# Patient Record
Sex: MALE | Race: WHITE | NOT HISPANIC OR LATINO
[De-identification: names, ages, dates, MRNs, and addresses within clinical notes are randomized per-mention and may not be internally consistent; named-entity substitution may affect disease eponyms.]

---
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
## 2025-01-17 ENCOUNTER — OUTPATIENT (OUTPATIENT)
Dept: OUTPATIENT SERVICES | Facility: HOSPITAL | Age: 20
LOS: 1 days | End: 2025-01-17
Payer: MEDICAID

## 2025-01-17 VITALS
HEIGHT: 65 IN | WEIGHT: 93.92 LBS | HEART RATE: 92 BPM | TEMPERATURE: 99 F | SYSTOLIC BLOOD PRESSURE: 123 MMHG | OXYGEN SATURATION: 98 % | RESPIRATION RATE: 18 BRPM | DIASTOLIC BLOOD PRESSURE: 78 MMHG

## 2025-01-17 DIAGNOSIS — Z92.89 PERSONAL HISTORY OF OTHER MEDICAL TREATMENT: Chronic | ICD-10-CM

## 2025-01-17 DIAGNOSIS — R62.50 UNSPECIFIED LACK OF EXPECTED NORMAL PHYSIOLOGICAL DEVELOPMENT IN CHILDHOOD: ICD-10-CM

## 2025-01-17 DIAGNOSIS — G80.9 CEREBRAL PALSY, UNSPECIFIED: ICD-10-CM

## 2025-01-17 DIAGNOSIS — Z01.818 ENCOUNTER FOR OTHER PREPROCEDURAL EXAMINATION: ICD-10-CM

## 2025-01-17 DIAGNOSIS — R56.9 UNSPECIFIED CONVULSIONS: ICD-10-CM

## 2025-01-17 DIAGNOSIS — K05.6 PERIODONTAL DISEASE, UNSPECIFIED: ICD-10-CM

## 2025-01-17 DIAGNOSIS — Z98.89 OTHER SPECIFIED POSTPROCEDURAL STATES: Chronic | ICD-10-CM

## 2025-01-17 DIAGNOSIS — Z98.890 OTHER SPECIFIED POSTPROCEDURAL STATES: Chronic | ICD-10-CM

## 2025-01-17 DIAGNOSIS — K02.9 DENTAL CARIES, UNSPECIFIED: ICD-10-CM

## 2025-01-17 DIAGNOSIS — Z96.89 PRESENCE OF OTHER SPECIFIED FUNCTIONAL IMPLANTS: Chronic | ICD-10-CM

## 2025-01-17 DIAGNOSIS — K02.62 DENTAL CARIES ON SMOOTH SURFACE PENETRATING INTO DENTIN: ICD-10-CM

## 2025-01-17 PROCEDURE — G0463: CPT

## 2025-01-17 RX ORDER — CLOBAZAM 10 MG/1
1 TABLET ORAL
Refills: 0 | DISCHARGE

## 2025-01-17 RX ORDER — ZONISAMIDE 100 MG/1
1 CAPSULE ORAL
Refills: 0 | DISCHARGE

## 2025-01-17 RX ORDER — ZONISAMIDE 100 MG/1
2 CAPSULE ORAL
Refills: 0 | DISCHARGE

## 2025-01-17 RX ORDER — LAMOTRIGINE 100 MG/1
1 TABLET ORAL
Refills: 0 | DISCHARGE

## 2025-01-17 NOTE — H&P PST ADULT - NSICDXPASTSURGICALHX_GEN_ALL_CORE_FT
PAST SURGICAL HISTORY:  History of corpus callostomy corpus callostomy 3/2021    History of dental surgery April 2020    History of MRI MRI brain with sedation on 11/13/20    S/P adenoidectomy 3/31/20 at [place]    S/P endoscopy 2012    Status post VNS (vagus nerve stimulator) placement

## 2025-01-17 NOTE — H&P PST ADULT - PROBLEM SELECTOR PLAN 4
Patient to see neurology prior to procedure for medical examination as patient has frequent seizures, his last one being today (1/17/25).

## 2025-01-17 NOTE — H&P PST ADULT - PROBLEM SELECTOR PLAN 1
Scheduled for comprehensive dental treatment with Dr. Janet Newman on 1/27/25.  Preop instructions provided to mother.  Questions answered.

## 2025-01-17 NOTE — H&P PST ADULT - MUSCULOSKELETAL
decreased muscle tone, generalized/ROM intact/strength 5/5 bilateral upper extremities/strength 5/5 bilateral lower extremities negative

## 2025-01-17 NOTE — H&P PST ADULT - NSICDXPASTMEDICALHX_GEN_ALL_CORE_FT
PAST MEDICAL HISTORY:  Autism spectrum     Chromosomal abnormality     CP (cerebral palsy)     Dental caries on smooth surface penetrating into dentin     Developmental delay     Periodontal disease     Symptomatic generalized epilepsy

## 2025-01-17 NOTE — H&P PST ADULT - ASSESSMENT
DASI score: 4.62  DASI activity: needs assistance with ADL, walking and stairs, no SOB or BURTON observed when performing.   Loose teeth or denture: denies

## 2025-01-17 NOTE — H&P PST ADULT - PROBLEM SELECTOR PLAN 3
Called Pts daughter Jamil Curry and Mukuleda Collar refused HFU with Dr Brenna Guy due to [place] (Dr Yessi Shankar) taking over care.    Started PCP removal for Pt Non-verbal

## 2025-01-17 NOTE — H&P PST ADULT - HISTORY OF PRESENT ILLNESS
19 year old male presents to PST for scheduled comprehensive dental treatment under anesthesia on 1/27/25 with Dr. Janet Newman. PMH seizures (last seizure -1/17/25), intractable lennox-gastaut syndrome, autism, constipation, ZOE, scoliosis, adenoidectomy, neurosurgery, vagal nerve stimulator, dental surgery. Patient is non-verbal, s/p seizures at home today, continuous follow up with neurology, pt on new medication regiment. Mother denies N/V, SOB, BURTON, no signs of chest pain.

## 2025-01-22 ENCOUNTER — APPOINTMENT (OUTPATIENT)
Dept: PEDIATRIC NEUROLOGY | Facility: CLINIC | Age: 20
End: 2025-01-22

## 2025-02-18 ENCOUNTER — NON-APPOINTMENT (OUTPATIENT)
Age: 20
End: 2025-02-18

## 2025-02-27 ENCOUNTER — APPOINTMENT (OUTPATIENT)
Dept: PEDIATRIC NEUROLOGY | Facility: CLINIC | Age: 20
End: 2025-02-27
Payer: MEDICAID

## 2025-02-27 VITALS — HEIGHT: 65 IN | WEIGHT: 98.25 LBS | BODY MASS INDEX: 16.37 KG/M2

## 2025-02-27 DIAGNOSIS — Q99.8 OTHER SPECIFIED CHROMOSOME ABNORMALITIES: ICD-10-CM

## 2025-02-27 DIAGNOSIS — G40.813 LENNOX-GASTAUT SYNDROME, INTRACTABLE, WITH STATUS EPILEPTICUS: ICD-10-CM

## 2025-02-27 DIAGNOSIS — F84.0 AUTISTIC DISORDER: ICD-10-CM

## 2025-02-27 PROCEDURE — 95970 ALYS NPGT W/O PRGRMG: CPT

## 2025-02-27 PROCEDURE — G2211 COMPLEX E/M VISIT ADD ON: CPT | Mod: NC

## 2025-02-27 PROCEDURE — 99215 OFFICE O/P EST HI 40 MIN: CPT

## 2025-03-12 ENCOUNTER — OUTPATIENT (OUTPATIENT)
Dept: OUTPATIENT SERVICES | Age: 20
LOS: 1 days | End: 2025-03-12

## 2025-03-12 VITALS
OXYGEN SATURATION: 100 % | RESPIRATION RATE: 18 BRPM | DIASTOLIC BLOOD PRESSURE: 66 MMHG | HEART RATE: 92 BPM | TEMPERATURE: 98 F | SYSTOLIC BLOOD PRESSURE: 99 MMHG

## 2025-03-12 DIAGNOSIS — G40.813 LENNOX-GASTAUT SYNDROME, INTRACTABLE, WITH STATUS EPILEPTICUS: ICD-10-CM

## 2025-03-12 DIAGNOSIS — Z98.89 OTHER SPECIFIED POSTPROCEDURAL STATES: Chronic | ICD-10-CM

## 2025-03-12 DIAGNOSIS — Z92.89 PERSONAL HISTORY OF OTHER MEDICAL TREATMENT: Chronic | ICD-10-CM

## 2025-03-12 DIAGNOSIS — Z98.890 OTHER SPECIFIED POSTPROCEDURAL STATES: Chronic | ICD-10-CM

## 2025-03-12 DIAGNOSIS — Z96.89 PRESENCE OF OTHER SPECIFIED FUNCTIONAL IMPLANTS: Chronic | ICD-10-CM

## 2025-03-12 LAB
ANION GAP SERPL CALC-SCNC: 13 MMOL/L — SIGNIFICANT CHANGE UP (ref 7–14)
BLD GP AB SCN SERPL QL: NEGATIVE — SIGNIFICANT CHANGE UP
BUN SERPL-MCNC: 14 MG/DL — SIGNIFICANT CHANGE UP (ref 7–23)
CALCIUM SERPL-MCNC: 9.2 MG/DL — SIGNIFICANT CHANGE UP (ref 8.4–10.5)
CHLORIDE SERPL-SCNC: 108 MMOL/L — HIGH (ref 98–107)
CO2 SERPL-SCNC: 22 MMOL/L — SIGNIFICANT CHANGE UP (ref 22–31)
CREAT SERPL-MCNC: 0.84 MG/DL — SIGNIFICANT CHANGE UP (ref 0.5–1.3)
EGFR: 129 ML/MIN/1.73M2 — SIGNIFICANT CHANGE UP
EGFR: 129 ML/MIN/1.73M2 — SIGNIFICANT CHANGE UP
GLUCOSE SERPL-MCNC: 58 MG/DL — LOW (ref 70–99)
HCT VFR BLD CALC: 47.8 % — SIGNIFICANT CHANGE UP (ref 39–50)
HGB BLD-MCNC: 16.6 G/DL — SIGNIFICANT CHANGE UP (ref 13–17)
MCHC RBC-ENTMCNC: 32 PG — SIGNIFICANT CHANGE UP (ref 27–34)
MCHC RBC-ENTMCNC: 34.7 G/DL — SIGNIFICANT CHANGE UP (ref 32–36)
MCV RBC AUTO: 92.1 FL — SIGNIFICANT CHANGE UP (ref 80–100)
NRBC # BLD AUTO: 0 K/UL — SIGNIFICANT CHANGE UP (ref 0–0)
NRBC # FLD: 0 K/UL — SIGNIFICANT CHANGE UP (ref 0–0)
NRBC BLD AUTO-RTO: 0 /100 WBCS — SIGNIFICANT CHANGE UP (ref 0–0)
PLATELET # BLD AUTO: 227 K/UL — SIGNIFICANT CHANGE UP (ref 150–400)
POTASSIUM SERPL-MCNC: 3.9 MMOL/L — SIGNIFICANT CHANGE UP (ref 3.5–5.3)
POTASSIUM SERPL-SCNC: 3.9 MMOL/L — SIGNIFICANT CHANGE UP (ref 3.5–5.3)
RBC # BLD: 5.19 M/UL — SIGNIFICANT CHANGE UP (ref 4.2–5.8)
RBC # FLD: 12.4 % — SIGNIFICANT CHANGE UP (ref 10.3–14.5)
RH IG SCN BLD-IMP: POSITIVE — SIGNIFICANT CHANGE UP
SODIUM SERPL-SCNC: 143 MMOL/L — SIGNIFICANT CHANGE UP (ref 135–145)
WBC # BLD: 6.68 K/UL — SIGNIFICANT CHANGE UP (ref 3.8–10.5)
WBC # FLD AUTO: 6.68 K/UL — SIGNIFICANT CHANGE UP (ref 3.8–10.5)

## 2025-03-12 NOTE — H&P PST ADULT - REASON FOR ADMISSION
The patient is here today for presurgical testing for scheduled removal and reinsertion of vagal nerve stimulator with Dr. Linder on 3/17/25 at [facility] [place] a same day admission.

## 2025-03-12 NOTE — H&P PST ADULT - HISTORY OF PRESENT ILLNESS
19y old male with developmental delays, wheelchair bound and intractable Lennox-Gastaut syndrome with status epilepticus. As per mom, his current vagal nerve stimulation is not working because there is no more battery life. He suffers from seizures daily. He has sialorrhea. ZOE as per mom he never had a sleep study. Mom states he never has a sleep study. He has been sedated in the past for MRI, vagal nerve stimulator implant and dental work. As per mom, he has had no issues with anesthesia. He is scheduled removal and reinsertion of vagal nerve stimulator with Dr. Linder [place]17[place] [facility] [place]. 19y old male with developmental delays, wheelchair bound and intractable Lennox-Gastaut syndrome with status epilepticus. As per mom, his current vagal nerve stimulation is not working because there is no more battery life. He suffers from seizures daily. As per mom she mentioned that when the original vagal nerve stimulator was placed 7 years ago he was only seizure free for one month. As per mom, he has seizures. She states his seizures can look like mild shaking, hanging head down.  He has sialorrhea. There is a diagnoses of ZOE. As per mom, he never had a sleep study. He has been sedated in the past for MRI, vagal nerve stimulator implant and dental work. As per mom, he has had no issues with anesthesia. He is scheduled removal and reinsertion of vagal nerve stimulator with Dr. Linder on 3/17/25 [place] [facility] [place] same day [place]. 19y old male with developmental delays, wheelchair bound and intractable Lennox-Gastaut syndrome with status epilepticus. As per mom, his current vagal nerve stimulation is not working because there is no more battery life. As per mom she mentioned that when the original vagal nerve stimulator was placed 7 years ago he was seizure free for only one month. He suffers from seizures daily. She states his seizures can look like mild shaking, head drops and stretching out legs bilaterally.  He has sialorrhea. There is a diagnoses of ZOE. As per mom, he never had a sleep study. He has been sedated in the past for MRI, vagal nerve stimulator implant and dental work. As per mom, he has had no issues with anesthesia. He is scheduled removal and reinsertion of vagal nerve stimulator with Dr. Linder on 3/17/25 at [facility] [place] as a same day admission.

## 2025-03-12 NOTE — H&P PST ADULT - NSICDXPROCEDURE_GEN_ALL_CORE_FT
PROCEDURES:  Removal of vagal nerve stimulator generator 12-Mar-2025 13:38:16  Citlaly Block  Implantation of vagal nerve stimulator electrode 12-Mar-2025 13:39:52  Citlaly Block

## 2025-03-12 NOTE — H&P PST ADULT - PROBLEM SELECTOR PLAN 1
scheduled removal and reinsertion of vagal nerve stimulator with Dr. Linder on 3/17/25 at [facility] [place].    Reviewed NPO instructions and to call surgeon and PACU if child were to become ill 24 hours prior to scheduled surgery. Mom expressed verbal understanding.    Blood drawn today CBC, T&S , BMP scheduled removal and reinsertion of vagal nerve stimulator with Dr. Linder on 3/17/25 at [facility] [place] as a same day admission.    Reviewed NPO instructions and to call surgeon and PACU if child were to become ill 24 hours prior to scheduled surgery. Mom expressed verbal understanding.    Blood drawn today CBC, T&S , BMP    [place]ee Mind[place] - wheelchair bound, nonverbal, daily seizures seizures can look like mild shaking, head drops and stretching out legs bilaterally.

## 2025-03-12 NOTE — H&P PST ADULT - ATTENDING COMMENTS
explained all the r/b/a of vns generator change. risk include bleeding infection stroke paralysis death.  parents understand and wish to proceed

## 2025-03-12 NOTE — H&P PST ADULT - MUSCULOSKELETAL COMMENTS
wheelchair bound, total care, contracted left arm, hunched back, hold head downward hypotonia, wheelchair bound, contracted upper extremities more so left arm

## 2025-03-12 NOTE — H&P PST ADULT - NSICDXPASTSURGICALHX_GEN_ALL_CORE_FT
PAST SURGICAL HISTORY:  History of corpus callostomy corpus callostomy 3/2021    History of dental surgery April 2020    History of MRI MRI brain with sedation on 11/13/20    S/P adenoidectomy 3/31/20 at [place]    S/P endoscopy 2012    Status post VNS (vagus nerve stimulator) placement

## 2025-03-12 NOTE — H&P PST ADULT - NSICDXPASTMEDICALHX_GEN_ALL_CORE_FT
PAST MEDICAL HISTORY:  Autism spectrum     Chromosomal abnormality     CP (cerebral palsy)     Dental caries on smooth surface penetrating into dentin     Developmental delay     Intractable Lennox-Gastaut syndrome with status epilepticus     Lennox-Gastaut syndrome     Periodontal disease     Symptomatic generalized epilepsy

## 2025-03-12 NOTE — H&P PST ADULT - ASSESSMENT
19y old male with developmental delays, wheelchair bound and intractable Lennox-Gastaut syndrome with status epilepticus. As per mom, his current vagal nerve stimulation is not working because there is no more battery life. He suffers from seizures daily. He has sialorrhea. ZOE as per mom he never had a sleep study. Mom states he never has a sleep study. He has been sedated in the past for MRI, vagal nerve stimulator implant and dental work. As per mom, he has had no issues with anesthesia. He is scheduled removal and reinsertion of vagal nerve stimulator with Dr. Linder on 3/17/25 at [facility] as a same day admission. 19y old male with developmental delays, wheelchair bound and intractable Lennox-Gastaut syndrome with status epilepticus. As per mom, his current vagal nerve stimulation is not working because there is no more battery life. He suffers from seizures daily. As per mom she mentioned that when the original vagal nerve stimulator was placed 7 years ago he was only seizure free for one month. As per mom, he has seizures. She states his seizures can look like mild shaking, hanging head down.  He has sialorrhea. There is a diagnoses of ZOE. As per mom, he never had a sleep study. He has been sedated in the past for MRI, vagal nerve stimulator implant and dental work. As per mom, he has had no issues with anesthesia. He is scheduled removal and reinsertion of vagal nerve stimulator with Dr. Linder on 3/17/25 at [facility] [place] as a same day admission. 19y old male with developmental delays, wheelchair bound and intractable Lennox-Gastaut syndrome with status epilepticus. As per mom, his current vagal nerve stimulation is not working because there is no more battery life. As per mom she mentioned that when the original vagal nerve stimulator was placed 7 years ago he was seizure free for only one month. He suffers from seizures daily. She states his seizures can look like mild shaking, head drops and stretching out legs bilaterally.  He has sialorrhea. There is a diagnoses of ZOE. As per mom, he never had a sleep study. He has been sedated in the past for MRI, vagal nerve stimulator implant and dental work. As per mom, he has had no issues with anesthesia. He is scheduled removal and reinsertion of vagal nerve stimulator with Dr. Linder on 3/17/25 at [facility] [place] as a same day admission.

## 2025-03-17 ENCOUNTER — TRANSCRIPTION ENCOUNTER (OUTPATIENT)
Age: 20
End: 2025-03-17

## 2025-03-17 ENCOUNTER — INPATIENT (INPATIENT)
Age: 20
LOS: 0 days | Discharge: HOME | End: 2025-03-17
Attending: NEUROLOGICAL SURGERY | Admitting: NEUROLOGICAL SURGERY

## 2025-03-17 ENCOUNTER — RX RENEWAL (OUTPATIENT)
Age: 20
End: 2025-03-17

## 2025-03-17 VITALS
TEMPERATURE: 98 F | SYSTOLIC BLOOD PRESSURE: 119 MMHG | HEART RATE: 92 BPM | WEIGHT: 93.92 LBS | RESPIRATION RATE: 18 BRPM | OXYGEN SATURATION: 97 % | DIASTOLIC BLOOD PRESSURE: 90 MMHG

## 2025-03-17 VITALS — SYSTOLIC BLOOD PRESSURE: 94 MMHG | OXYGEN SATURATION: 99 % | HEART RATE: 79 BPM | DIASTOLIC BLOOD PRESSURE: 63 MMHG

## 2025-03-17 DIAGNOSIS — Z98.89 OTHER SPECIFIED POSTPROCEDURAL STATES: Chronic | ICD-10-CM

## 2025-03-17 DIAGNOSIS — Z92.89 PERSONAL HISTORY OF OTHER MEDICAL TREATMENT: Chronic | ICD-10-CM

## 2025-03-17 DIAGNOSIS — Z98.890 OTHER SPECIFIED POSTPROCEDURAL STATES: Chronic | ICD-10-CM

## 2025-03-17 DIAGNOSIS — Z96.89 PRESENCE OF OTHER SPECIFIED FUNCTIONAL IMPLANTS: Chronic | ICD-10-CM

## 2025-03-17 LAB — GLUCOSE BLDC GLUCOMTR-MCNC: 96 MG/DL — SIGNIFICANT CHANGE UP (ref 70–99)

## 2025-03-17 DEVICE — STIM SENTIVA VAGUS NERVE: Type: IMPLANTABLE DEVICE | Status: FUNCTIONAL

## 2025-03-17 RX ORDER — OXYCODONE HYDROCHLORIDE 30 MG/1
5 TABLET ORAL ONCE
Refills: 0 | Status: DISCONTINUED | OUTPATIENT
Start: 2025-03-17 | End: 2025-03-17

## 2025-03-17 RX ORDER — ACETAMINOPHEN 500 MG/5ML
2 LIQUID (ML) ORAL
Qty: 0 | Refills: 0 | DISCHARGE

## 2025-03-17 RX ORDER — IBUPROFEN 200 MG
2 TABLET ORAL
Qty: 0 | Refills: 0 | DISCHARGE

## 2025-03-17 RX ORDER — KETOROLAC TROMETHAMINE 30 MG/ML
20 INJECTION, SOLUTION INTRAMUSCULAR; INTRAVENOUS ONCE
Refills: 0 | Status: DISCONTINUED | OUTPATIENT
Start: 2025-03-17 | End: 2025-03-17

## 2025-03-17 RX ORDER — SODIUM CHLORIDE 9 G/1000ML
500 INJECTION, SOLUTION INTRAVENOUS
Refills: 0 | Status: DISCONTINUED | OUTPATIENT
Start: 2025-03-17 | End: 2025-03-17

## 2025-03-17 NOTE — ASU DISCHARGE PLAN (ADULT/PEDIATRIC) - FINANCIAL ASSISTANCE
[facility] provides services at a reduced cost to those who are determined to be eligible through [facility]’s financial assistance program. Information regarding [facility]’s financial assistance program can be found by going to [email or web address] or by calling [phone].

## 2025-03-17 NOTE — ASU PATIENT PROFILE, PEDIATRIC - NSICDXPASTSURGICALHX_GEN_ALL_CORE_FT
PAST SURGICAL HISTORY:  History of corpus callostomy corpus callostomy 3/2021    History of dental surgery April 2020    History of MRI MRI brain with sedation on 11/13/20    S/P adenoidectomy 3/31/20 at [place]    S/P endoscopy 2012    Status post VNS (vagus nerve stimulator) placement

## 2025-03-17 NOTE — ASU DISCHARGE PLAN (ADULT/PEDIATRIC) - CARE PROVIDER_API CALL
Mat Linder  Neurosurgery  [address] [place] [address] [address]  [place] [address]  Phone: [phone]  Fax: [phone]  Established Patient  Follow Up Time: 2 weeks

## 2025-03-17 NOTE — PACU DISCHARGE NOTE - COMMENTS
patient seen/examined. no apparent anesthesia related complications. meeting discharge criteria. ok to discharge home.
patient seen/examined. no apparent anesthesia related complications. meeting discharge criteria. ok to discharge home.

## 2025-03-20 PROBLEM — G40.813 LENNOX-GASTAUT SYNDROME, INTRACTABLE, WITH STATUS EPILEPTICUS: Chronic | Status: ACTIVE | Noted: 2025-03-12

## 2025-03-20 NOTE — REVIEW OF SYSTEMS
Subjective   History of Present Illness  Patient is 89-year-old female complaining of cough congestion and low-grade fever today.  Family offers no other complaints.      Review of Systems    Past Medical History:   Diagnosis Date    Acute blood loss anemia 02/23/2025    Acute GI bleeding 02/22/2025    Bilateral pseudophakia 04/10/2019    Chronic hypotension 03/10/2025    Collagenous colitis 06/08/2022    Depression 03/28/2024    Epiretinal membrane (ERM) of left eye 07/07/2021    Essential tremor 02/21/2023    Hypertension     Hypothyroidism 06/11/2020    Large hiatal hernia 02/23/2025    MCI (mild cognitive impairment) 11/03/2021    Moderate protein-calorie malnutrition 02/04/2025    Multiple duodenal ulcers 02/23/2025    Osteoporosis 11/10/2016    Other gastritis with bleeding 02/23/2025    Primary open angle glaucoma (POAG) of both eyes 07/07/2021    Rosacea 01/28/2021    Severe pulmonary hypertension 03/10/2025    UTI (urinary tract infection) 01/24/2025    Valvular heart disease 03/10/2025    Weight loss, unintentional 08/28/2023       Allergies   Allergen Reactions    Contrast Dye (Echo Or Unknown Ct/Mr) Unknown - High Severity    Sulfa Antibiotics Unknown - High Severity       Past Surgical History:   Procedure Laterality Date    BACK SURGERY      kyphoplasty    BREAST SURGERY      benign cyst removed    COLONOSCOPY N/A 2/23/2025    Procedure: COLONOSCOPY with POLYPECTOMY;  Surgeon: Kirby Saul MD;  Location: [facility] ENDOSCOPY;  Service: Gastroenterology;  Laterality: N/A;  POST: MULTIPLE COLON POLYPS, EXTENSIVE DIVERTICULOSIS,HEMORRHOIDS    ENDOSCOPY N/A 2/23/2025    Procedure: ESOPHAGOGASTRODUODENOSCOPY with GOLD PROBE & BIOPSY;  Surgeon: Kirby Saul MD;  Location: [facility] ENDOSCOPY;  Service: Gastroenterology;  Laterality: N/A;  POST:HIATAL HERNIA,GASTRITIS    HYSTERECTOMY      LAPAROSCOPIC CHOLECYSTECTOMY         No family history on file.    Social History     Socioeconomic History    Marital  status:    Tobacco Use    Smoking status: Never    Smokeless tobacco: Never   Vaping Use    Vaping status: Never Used   Substance and Sexual Activity    Alcohol use: Never    Drug use: Never    Sexual activity: Never           Objective   Physical Exam  Neck has no adenopathy JVD or bruits.  Lungs are clear.  Heart has regular rate rhythm without murmur.  Chest nontender.  Abdomen soft nontender with normal bowel sounds.  Back has no tenderness.  Procedures           ED Course      Results for orders placed or performed during the hospital encounter of 03/19/25   Respiratory Panel PCR w/COVID-19(SARS-CoV-2) THIERRY/AMITA/CUAUHTEMOC/PAD/COR/PARKER In-House, NP Swab in UTM/VTM, 2 HR TAT - Swab, Nasopharynx    Collection Time: 03/19/25 10:28 PM    Specimen: Nasopharynx; Swab   Result Value Ref Range    ADENOVIRUS, PCR Not Detected Not Detected    Coronavirus 229E Not Detected Not Detected    Coronavirus HKU1 Not Detected Not Detected    Coronavirus NL63 Not Detected Not Detected    Coronavirus OC43 Not Detected Not Detected    COVID19 Not Detected Not Detected - Ref. Range    Human Metapneumovirus Not Detected Not Detected    Human Rhinovirus/Enterovirus Not Detected Not Detected    Influenza A PCR Not Detected Not Detected    Influenza B PCR Not Detected Not Detected    Parainfluenza Virus 1 Not Detected Not Detected    Parainfluenza Virus 2 Not Detected Not Detected    Parainfluenza Virus 3 Not Detected Not Detected    Parainfluenza Virus 4 Not Detected Not Detected    RSV, PCR Detected (A) Not Detected    Bordetella pertussis pcr Not Detected Not Detected    Bordetella parapertussis PCR Not Detected Not Detected    Chlamydophila pneumoniae PCR Not Detected Not Detected    Mycoplasma pneumo by PCR Not Detected Not Detected   Basic Metabolic Panel    Collection Time: 03/19/25 10:28 PM    Specimen: Blood   Result Value Ref Range    Glucose 125 (H) 65 - 99 mg/dL    BUN 19 8 - 23 mg/dL    Creatinine 0.81 0.57 - 1.00 mg/dL     Sodium 142 136 - 145 mmol/L    Potassium 3.2 (L) 3.5 - 5.2 mmol/L    Chloride 99 98 - 107 mmol/L    CO2 30.5 (H) 22.0 - 29.0 mmol/L    Calcium 9.2 8.2 - 9.6 mg/dL    BUN/Creatinine Ratio 23.5 7.0 - 25.0    Anion Gap 12.5 5.0 - 15.0 mmol/L    eGFR 65.7 >60.0 mL/min/1.73   CBC Auto Differential    Collection Time: 03/19/25 10:28 PM    Specimen: Blood   Result Value Ref Range    WBC 10.43 3.40 - 10.80 10*3/mm3    RBC 3.55 (L) 3.77 - 5.28 10*6/mm3    Hemoglobin 9.7 (L) 12.0 - 15.9 g/dL    Hematocrit 33.2 (L) 34.0 - 46.6 %    MCV 93.5 79.0 - 97.0 fL    MCH 27.3 26.6 - 33.0 pg    MCHC 29.2 (L) 31.5 - 35.7 g/dL    RDW 16.2 (H) 12.3 - 15.4 %    RDW-SD 54.7 (H) 37.0 - 54.0 fl    MPV 8.2 6.0 - 12.0 fL    Platelets 490 (H) 140 - 450 10*3/mm3    Neutrophil % 74.9 42.7 - 76.0 %    Lymphocyte % 10.1 (L) 19.6 - 45.3 %    Monocyte % 12.2 (H) 5.0 - 12.0 %    Eosinophil % 1.2 0.3 - 6.2 %    Basophil % 1.1 0.0 - 1.5 %    Immature Grans % 0.5 0.0 - 0.5 %    Neutrophils, Absolute 7.83 (H) 1.70 - 7.00 10*3/mm3    Lymphocytes, Absolute 1.05 0.70 - 3.10 10*3/mm3    Monocytes, Absolute 1.27 (H) 0.10 - 0.90 10*3/mm3    Eosinophils, Absolute 0.12 0.00 - 0.40 10*3/mm3    Basophils, Absolute 0.11 0.00 - 0.20 10*3/mm3    Immature Grans, Absolute 0.05 0.00 - 0.05 10*3/mm3    nRBC 0.0 0.0 - 0.2 /100 WBC   Urinalysis With Microscopic If Indicated (No Culture) - Urine, Catheter    Collection Time: 03/19/25 10:46 PM    Specimen: Urine, Catheter   Result Value Ref Range    Color, UA Yellow Yellow, Straw    Appearance, UA Clear Clear    pH, UA 5.5 5.0 - 8.0    Specific Gravity, UA 1.017 1.005 - 1.030    Glucose, UA Negative Negative    Ketones, UA Negative Negative    Bilirubin, UA Negative Negative    Blood, UA Negative Negative    Protein, UA Negative Negative    Leuk Esterase, UA Moderate (2+) (A) Negative    Nitrite, UA Negative Negative    Urobilinogen, UA 0.2 E.U./dL 0.2 - 1.0 E.U./dL   Urinalysis, Microscopic Only - Urine, Catheter    Collection  Time: 03/19/25 10:46 PM    Specimen: Urine, Catheter   Result Value Ref Range    RBC, UA None Seen None Seen, 0-2 /HPF    WBC, UA 11-20 (A) None Seen, 0-2 /HPF    Bacteria, UA None Seen None Seen /HPF    Squamous Epithelial Cells, UA None Seen None Seen, 0-2 /HPF    Hyaline Casts, UA None Seen None Seen /LPF    Methodology Manual Light Microscopy      No radiology results for the last day                                                     Medical Decision Making  Respiratory panel is positive for RSV.  UA shows no evidence of urinary tract infection.  Patient has no leukocytosis no left shift and anemia at her baseline.  BMP shows no renal insufficiency or electrolyte abnormality.  Patient will be discharged.  She is on ibuprofen for fever control.  She will see MD for recheck as needed.    Amount and/or Complexity of Data Reviewed  Labs: ordered. Decision-making details documented in ED Course.    Risk  Prescription drug management.        Final diagnoses:   RSV bronchitis   Fever, unspecified fever cause       ED Disposition  ED Disposition       ED Disposition   Discharge    Condition   Stable    Comment   --               No follow-up provider specified.       Medication List      No changes were made to your prescriptions during this visit.            Reza Sol MD  03/19/25 5100     [Normal] : Hematologic/Lymphatic [FreeTextEntry8] : see HPI [de-identified] : see HPI

## 2025-03-26 RX ORDER — PERAMPANEL 2 MG/1
2 TABLET ORAL
Qty: 60 | Refills: 0 | Status: ACTIVE | COMMUNITY
Start: 2025-03-26 | End: 1900-01-01

## 2025-04-09 ENCOUNTER — APPOINTMENT (OUTPATIENT)
Dept: PEDIATRIC NEUROLOGY | Facility: CLINIC | Age: 20
End: 2025-04-09
Payer: MEDICAID

## 2025-04-09 VITALS — WEIGHT: 94.69 LBS

## 2025-04-09 DIAGNOSIS — G40.813 LENNOX-GASTAUT SYNDROME, INTRACTABLE, WITH STATUS EPILEPTICUS: ICD-10-CM

## 2025-04-09 DIAGNOSIS — Q99.8 OTHER SPECIFIED CHROMOSOME ABNORMALITIES: ICD-10-CM

## 2025-04-09 DIAGNOSIS — F84.0 AUTISTIC DISORDER: ICD-10-CM

## 2025-04-09 PROCEDURE — G2211 COMPLEX E/M VISIT ADD ON: CPT | Mod: NC

## 2025-04-09 PROCEDURE — 99214 OFFICE O/P EST MOD 30 MIN: CPT

## 2025-04-09 PROCEDURE — 95977 ALYS CPLX CN NPGT PRGRMG: CPT

## 2025-04-23 ENCOUNTER — INPATIENT (INPATIENT)
Age: 20
LOS: 0 days | Discharge: HOME | End: 2025-04-24
Attending: PEDIATRICS | Admitting: PEDIATRICS
Payer: MEDICAID

## 2025-04-23 ENCOUNTER — EMERGENCY (EMERGENCY)
Facility: HOSPITAL | Age: 20
LOS: 1 days | End: 2025-04-23
Attending: EMERGENCY MEDICINE | Admitting: EMERGENCY MEDICINE
Payer: MEDICAID

## 2025-04-23 ENCOUNTER — TRANSCRIPTION ENCOUNTER (OUTPATIENT)
Age: 20
End: 2025-04-23

## 2025-04-23 VITALS
SYSTOLIC BLOOD PRESSURE: 113 MMHG | TEMPERATURE: 98 F | WEIGHT: 93.04 LBS | DIASTOLIC BLOOD PRESSURE: 70 MMHG | RESPIRATION RATE: 26 BRPM | HEART RATE: 90 BPM | OXYGEN SATURATION: 99 % | HEIGHT: 65 IN

## 2025-04-23 VITALS
OXYGEN SATURATION: 99 % | DIASTOLIC BLOOD PRESSURE: 59 MMHG | SYSTOLIC BLOOD PRESSURE: 101 MMHG | HEART RATE: 80 BPM | TEMPERATURE: 98 F | RESPIRATION RATE: 18 BRPM

## 2025-04-23 VITALS
DIASTOLIC BLOOD PRESSURE: 64 MMHG | WEIGHT: 93.04 LBS | HEART RATE: 83 BPM | TEMPERATURE: 98 F | OXYGEN SATURATION: 100 % | SYSTOLIC BLOOD PRESSURE: 97 MMHG | RESPIRATION RATE: 18 BRPM

## 2025-04-23 DIAGNOSIS — Z98.890 OTHER SPECIFIED POSTPROCEDURAL STATES: Chronic | ICD-10-CM

## 2025-04-23 DIAGNOSIS — Z96.89 PRESENCE OF OTHER SPECIFIED FUNCTIONAL IMPLANTS: Chronic | ICD-10-CM

## 2025-04-23 DIAGNOSIS — Z92.89 PERSONAL HISTORY OF OTHER MEDICAL TREATMENT: Chronic | ICD-10-CM

## 2025-04-23 DIAGNOSIS — Z98.89 OTHER SPECIFIED POSTPROCEDURAL STATES: Chronic | ICD-10-CM

## 2025-04-23 DIAGNOSIS — R56.9 UNSPECIFIED CONVULSIONS: ICD-10-CM

## 2025-04-23 PROBLEM — G40.812 LENNOX-GASTAUT SYNDROME, NOT INTRACTABLE, WITHOUT STATUS EPILEPTICUS: Chronic | Status: ACTIVE | Noted: 2025-03-12

## 2025-04-23 LAB
ALBUMIN SERPL ELPH-MCNC: 4 G/DL — SIGNIFICANT CHANGE UP (ref 3.3–5)
ALP SERPL-CCNC: 86 U/L — SIGNIFICANT CHANGE UP (ref 40–120)
ALT FLD-CCNC: 32 U/L — SIGNIFICANT CHANGE UP (ref 12–78)
ANION GAP SERPL CALC-SCNC: 4 MMOL/L — LOW (ref 5–17)
APTT BLD: 36.1 SEC — SIGNIFICANT CHANGE UP (ref 26.1–36.8)
AST SERPL-CCNC: 18 U/L — SIGNIFICANT CHANGE UP (ref 15–37)
B PERT DNA SPEC QL NAA+PROBE: SIGNIFICANT CHANGE UP
B PERT+PARAPERT DNA PNL SPEC NAA+PROBE: SIGNIFICANT CHANGE UP
BASOPHILS # BLD AUTO: 0.03 K/UL — SIGNIFICANT CHANGE UP (ref 0–0.2)
BASOPHILS NFR BLD AUTO: 0.4 % — SIGNIFICANT CHANGE UP (ref 0–2)
BILIRUB SERPL-MCNC: 0.6 MG/DL — SIGNIFICANT CHANGE UP (ref 0.2–1.2)
BUN SERPL-MCNC: 14 MG/DL — SIGNIFICANT CHANGE UP (ref 7–23)
C PNEUM DNA SPEC QL NAA+PROBE: SIGNIFICANT CHANGE UP
CALCIUM SERPL-MCNC: 8.7 MG/DL — SIGNIFICANT CHANGE UP (ref 8.5–10.1)
CHLORIDE SERPL-SCNC: 111 MMOL/L — HIGH (ref 96–108)
CK SERPL-CCNC: 505 U/L — HIGH (ref 26–308)
CO2 SERPL-SCNC: 27 MMOL/L — SIGNIFICANT CHANGE UP (ref 22–31)
CREAT SERPL-MCNC: 0.92 MG/DL — SIGNIFICANT CHANGE UP (ref 0.5–1.3)
EGFR: 123 ML/MIN/1.73M2 — SIGNIFICANT CHANGE UP
EGFR: 123 ML/MIN/1.73M2 — SIGNIFICANT CHANGE UP
EOSINOPHIL # BLD AUTO: 0.13 K/UL — SIGNIFICANT CHANGE UP (ref 0–0.5)
EOSINOPHIL NFR BLD AUTO: 1.7 % — SIGNIFICANT CHANGE UP (ref 0–6)
FLUAV SUBTYP SPEC NAA+PROBE: SIGNIFICANT CHANGE UP
FLUBV RNA SPEC QL NAA+PROBE: SIGNIFICANT CHANGE UP
GLUCOSE SERPL-MCNC: 82 MG/DL — SIGNIFICANT CHANGE UP (ref 70–99)
HADV DNA SPEC QL NAA+PROBE: SIGNIFICANT CHANGE UP
HCOV 229E RNA SPEC QL NAA+PROBE: SIGNIFICANT CHANGE UP
HCOV HKU1 RNA SPEC QL NAA+PROBE: SIGNIFICANT CHANGE UP
HCOV NL63 RNA SPEC QL NAA+PROBE: SIGNIFICANT CHANGE UP
HCOV OC43 RNA SPEC QL NAA+PROBE: SIGNIFICANT CHANGE UP
HCT VFR BLD CALC: 44.9 % — SIGNIFICANT CHANGE UP (ref 39–50)
HGB BLD-MCNC: 16 G/DL — SIGNIFICANT CHANGE UP (ref 13–17)
HMPV RNA SPEC QL NAA+PROBE: SIGNIFICANT CHANGE UP
HPIV1 RNA SPEC QL NAA+PROBE: SIGNIFICANT CHANGE UP
HPIV2 RNA SPEC QL NAA+PROBE: SIGNIFICANT CHANGE UP
HPIV3 RNA SPEC QL NAA+PROBE: SIGNIFICANT CHANGE UP
HPIV4 RNA SPEC QL NAA+PROBE: SIGNIFICANT CHANGE UP
IMM GRANULOCYTES NFR BLD AUTO: 0.5 % — SIGNIFICANT CHANGE UP (ref 0–0.9)
INR BLD: 1.1 RATIO — SIGNIFICANT CHANGE UP (ref 0.85–1.16)
LYMPHOCYTES # BLD AUTO: 1.56 K/UL — SIGNIFICANT CHANGE UP (ref 1–3.3)
LYMPHOCYTES # BLD AUTO: 20.5 % — SIGNIFICANT CHANGE UP (ref 13–44)
M PNEUMO DNA SPEC QL NAA+PROBE: SIGNIFICANT CHANGE UP
MCHC RBC-ENTMCNC: 31.8 PG — SIGNIFICANT CHANGE UP (ref 27–34)
MCHC RBC-ENTMCNC: 35.6 G/DL — SIGNIFICANT CHANGE UP (ref 32–36)
MCV RBC AUTO: 89.3 FL — SIGNIFICANT CHANGE UP (ref 80–100)
MONOCYTES # BLD AUTO: 0.52 K/UL — SIGNIFICANT CHANGE UP (ref 0–0.9)
MONOCYTES NFR BLD AUTO: 6.8 % — SIGNIFICANT CHANGE UP (ref 2–14)
NEUTROPHILS # BLD AUTO: 5.34 K/UL — SIGNIFICANT CHANGE UP (ref 1.8–7.4)
NEUTROPHILS NFR BLD AUTO: 70.1 % — SIGNIFICANT CHANGE UP (ref 43–77)
NRBC BLD AUTO-RTO: 0 /100 WBCS — SIGNIFICANT CHANGE UP (ref 0–0)
PHENYTOIN FREE SERPL-MCNC: 16.5 UG/ML — SIGNIFICANT CHANGE UP (ref 10–20)
PLATELET # BLD AUTO: 210 K/UL — SIGNIFICANT CHANGE UP (ref 150–400)
POTASSIUM SERPL-MCNC: 4 MMOL/L — SIGNIFICANT CHANGE UP (ref 3.5–5.3)
POTASSIUM SERPL-SCNC: 4 MMOL/L — SIGNIFICANT CHANGE UP (ref 3.5–5.3)
PROT SERPL-MCNC: 7 G/DL — SIGNIFICANT CHANGE UP (ref 6–8.3)
PROTHROM AB SERPL-ACNC: 12.8 SEC — SIGNIFICANT CHANGE UP (ref 9.9–13.4)
RAPID RVP RESULT: SIGNIFICANT CHANGE UP
RBC # BLD: 5.03 M/UL — SIGNIFICANT CHANGE UP (ref 4.2–5.8)
RBC # FLD: 12.4 % — SIGNIFICANT CHANGE UP (ref 10.3–14.5)
RSV RNA SPEC QL NAA+PROBE: SIGNIFICANT CHANGE UP
RV+EV RNA SPEC QL NAA+PROBE: SIGNIFICANT CHANGE UP
SARS-COV-2 RNA SPEC QL NAA+PROBE: SIGNIFICANT CHANGE UP
SODIUM SERPL-SCNC: 142 MMOL/L — SIGNIFICANT CHANGE UP (ref 135–145)
TROPONIN I, HIGH SENSITIVITY RESULT: 3.3 NG/L — SIGNIFICANT CHANGE UP
WBC # BLD: 7.62 K/UL — SIGNIFICANT CHANGE UP (ref 3.8–10.5)
WBC # FLD AUTO: 7.62 K/UL — SIGNIFICANT CHANGE UP (ref 3.8–10.5)

## 2025-04-23 PROCEDURE — 36415 COLL VENOUS BLD VENIPUNCTURE: CPT

## 2025-04-23 PROCEDURE — 85730 THROMBOPLASTIN TIME PARTIAL: CPT

## 2025-04-23 PROCEDURE — 84484 ASSAY OF TROPONIN QUANT: CPT

## 2025-04-23 PROCEDURE — 99291 CRITICAL CARE FIRST HOUR: CPT

## 2025-04-23 PROCEDURE — 99285 EMERGENCY DEPT VISIT HI MDM: CPT

## 2025-04-23 PROCEDURE — 71045 X-RAY EXAM CHEST 1 VIEW: CPT | Mod: 26

## 2025-04-23 PROCEDURE — 96374 THER/PROPH/DIAG INJ IV PUSH: CPT

## 2025-04-23 PROCEDURE — 93005 ELECTROCARDIOGRAM TRACING: CPT

## 2025-04-23 PROCEDURE — 71045 X-RAY EXAM CHEST 1 VIEW: CPT

## 2025-04-23 PROCEDURE — 93010 ELECTROCARDIOGRAM REPORT: CPT

## 2025-04-23 PROCEDURE — 82962 GLUCOSE BLOOD TEST: CPT

## 2025-04-23 PROCEDURE — 80053 COMPREHEN METABOLIC PANEL: CPT

## 2025-04-23 PROCEDURE — 85610 PROTHROMBIN TIME: CPT

## 2025-04-23 PROCEDURE — 99291 CRITICAL CARE FIRST HOUR: CPT | Mod: 25

## 2025-04-23 PROCEDURE — 82550 ASSAY OF CK (CPK): CPT

## 2025-04-23 PROCEDURE — 85025 COMPLETE CBC W/AUTO DIFF WBC: CPT

## 2025-04-23 RX ORDER — CLOBAZAM 20 MG/1
20 TABLET ORAL AT BEDTIME
Refills: 0 | Status: DISCONTINUED | OUTPATIENT
Start: 2025-04-23 | End: 2025-04-23

## 2025-04-23 RX ORDER — ZONISAMIDE 25 MG
2 CAPSULE ORAL
Refills: 0 | DISCHARGE

## 2025-04-23 RX ORDER — SODIUM CHLORIDE 9 G/1000ML
1000 INJECTION, SOLUTION INTRAVENOUS
Refills: 0 | Status: DISCONTINUED | OUTPATIENT
Start: 2025-04-23 | End: 2025-04-24

## 2025-04-23 RX ORDER — LAMOTRIGINE 150 MG/1
300 TABLET ORAL
Refills: 0 | Status: DISCONTINUED | OUTPATIENT
Start: 2025-04-23 | End: 2025-04-24

## 2025-04-23 RX ORDER — LAMOTRIGINE 150 MG/1
1 TABLET ORAL
Refills: 0 | DISCHARGE

## 2025-04-23 RX ORDER — LAMOTRIGINE 150 MG/1
300 TABLET ORAL AT BEDTIME
Refills: 0 | Status: DISCONTINUED | OUTPATIENT
Start: 2025-04-23 | End: 2025-04-23

## 2025-04-23 RX ORDER — ZONISAMIDE 25 MG
250 CAPSULE ORAL AT BEDTIME
Refills: 0 | Status: DISCONTINUED | OUTPATIENT
Start: 2025-04-23 | End: 2025-04-24

## 2025-04-23 RX ORDER — LORAZEPAM 4 MG/ML
2 VIAL (ML) INJECTION ONCE
Refills: 0 | Status: DISCONTINUED | OUTPATIENT
Start: 2025-04-23 | End: 2025-04-24

## 2025-04-23 RX ORDER — ZONISAMIDE 25 MG
50 CAPSULE ORAL AT BEDTIME
Refills: 0 | Status: DISCONTINUED | OUTPATIENT
Start: 2025-04-23 | End: 2025-04-23

## 2025-04-23 RX ORDER — FOSPHENYTOIN SODIUM 50 MG/ML
650 INJECTION INTRAMUSCULAR; INTRAVENOUS ONCE
Refills: 0 | Status: COMPLETED | OUTPATIENT
Start: 2025-04-23 | End: 2025-04-23

## 2025-04-23 RX ORDER — CLOBAZAM 20 MG/1
20 TABLET ORAL
Refills: 0 | Status: DISCONTINUED | OUTPATIENT
Start: 2025-04-23 | End: 2025-04-24

## 2025-04-23 RX ORDER — ZONISAMIDE 25 MG
200 CAPSULE ORAL
Refills: 0 | Status: DISCONTINUED | OUTPATIENT
Start: 2025-04-23 | End: 2025-04-24

## 2025-04-23 RX ORDER — DIAZEPAM 2 MG/1
10 TABLET ORAL ONCE
Refills: 0 | Status: DISCONTINUED | OUTPATIENT
Start: 2025-04-23 | End: 2025-04-24

## 2025-04-23 RX ORDER — CLOBAZAM 20 MG/1
15 TABLET ORAL
Refills: 0 | Status: DISCONTINUED | OUTPATIENT
Start: 2025-04-23 | End: 2025-04-24

## 2025-04-23 RX ORDER — LAMOTRIGINE 150 MG/1
250 TABLET ORAL
Refills: 0 | Status: DISCONTINUED | OUTPATIENT
Start: 2025-04-23 | End: 2025-04-24

## 2025-04-23 RX ORDER — LORAZEPAM 4 MG/ML
2 VIAL (ML) INJECTION ONCE
Refills: 0 | Status: DISCONTINUED | OUTPATIENT
Start: 2025-04-23 | End: 2025-04-23

## 2025-04-23 RX ORDER — ZONISAMIDE 25 MG
200 CAPSULE ORAL AT BEDTIME
Refills: 0 | Status: DISCONTINUED | OUTPATIENT
Start: 2025-04-23 | End: 2025-04-23

## 2025-04-23 RX ORDER — FOSPHENYTOIN SODIUM 50 MG/ML
650 INJECTION INTRAMUSCULAR; INTRAVENOUS ONCE
Refills: 0 | Status: DISCONTINUED | OUTPATIENT
Start: 2025-04-23 | End: 2025-04-23

## 2025-04-23 RX ADMIN — Medication 1000 MILLILITER(S): at 12:01

## 2025-04-23 RX ADMIN — Medication 250 MILLIGRAM(S): at 20:47

## 2025-04-23 RX ADMIN — SODIUM CHLORIDE 82 MILLILITER(S): 9 INJECTION, SOLUTION INTRAVENOUS at 21:54

## 2025-04-23 RX ADMIN — SODIUM CHLORIDE 82 MILLILITER(S): 9 INJECTION, SOLUTION INTRAVENOUS at 14:44

## 2025-04-23 RX ADMIN — LAMOTRIGINE 300 MILLIGRAM(S): 150 TABLET ORAL at 20:46

## 2025-04-23 RX ADMIN — FOSPHENYTOIN SODIUM 126 MILLIGRAM(S) PE: 50 INJECTION INTRAMUSCULAR; INTRAVENOUS at 12:35

## 2025-04-23 RX ADMIN — CLOBAZAM 20 MILLIGRAM(S): 20 TABLET ORAL at 19:54

## 2025-04-23 NOTE — PHARMACOTHERAPY INTERVENTION NOTE - INTERVENTION TYPE RECOOMEND
Patient called back to get the results of his Zio Patch. I went over the results with him. Patient said he has a Cardiologist through [facility] so he will follow up with them as scheduled. I told him they should be able to see the Zio Patch results in Epic. He was grateful for the results.    Therapy Recommended - Drug indicated but not ordered

## 2025-04-23 NOTE — PATIENT PROFILE ADULT - FUNCTIONAL SCREEN CURRENT LEVEL: SWALLOWING (IF SCORE 2 OR MORE FOR ANY ITEM, CONSULT REHAB SERVICES), MLM)
Cassi Ponce NP P [facility], S Np Fp Nurse Msg Pool  Can we please order thyroid UTS to evaluate nodule      Incidental finding on CT scan.    0 = swallows foods/liquids without difficulty

## 2025-04-23 NOTE — H&P PEDIATRIC - NSICDXPASTMEDICALHX_GEN_ALL_CORE_FT
PAST MEDICAL HISTORY:  Autism spectrum     Chromosomal abnormality     CP (cerebral palsy)     Developmental delay     Intractable Lennox-Gastaut syndrome with status epilepticus     Lennox-Gastaut syndrome     Symptomatic generalized epilepsy

## 2025-04-23 NOTE — PATIENT PROFILE ADULT - FALL HARM RISK - RISK INTERVENTIONS
Assistance OOB with selected safe patient handling equipment/Assistance with ambulation/Communicate Fall Risk and Risk Factors to all staff, patient, and family/Discuss with provider need for PT consult/Monitor gait and stability/Provide patient with walking aids - walker, cane, crutches/Reinforce activity limits and safety measures with patient and family/Reorient to person, place and time as needed/Review medications for side effects contributing to fall risk/Sit up slowly, dangle for a short time, stand at bedside before walking/Visual Cue: Yellow wristband/Bed in lowest position, wheels locked, appropriate side rails in place/Call bell, personal items and telephone in reach/Instruct patient to call for assistance before getting out of bed or chair/Non-slip footwear when patient is out of bed/[place] to call system/Physically safe environment - no spills, clutter or unnecessary equipment/Purposeful Proactive Rounding/Room/bathroom lighting operational, light cord in reach/Unable to comprehend

## 2025-04-23 NOTE — H&P PEDIATRIC - NSHPLABSRESULTS_GEN_ALL_CORE
16.0   7.62  )-----------( 210      ( 23 Apr 2025 11:52 )             44.9     04-23    142  |  111[H]  |  14  ----------------------------<  82  4.0   |  27  |  0.92    Ca    8.7      23 Apr 2025 12:29    TPro  7.0  /  Alb  4.0  /  TBili  0.6  /  DBili  x   /  AST  18  /  ALT  32  /  AlkPhos  86  04-23      Creatine Kinase: 505 U/L (04.23.25 @ 12:29)     Phenytoin Level, Serum: 16.5 ug/mL (04.23.25 @ 14:55)       Respiratory Viral Panel with COVID-19 by DONY (04.23.25 @ 15:05)   Rapid RVP Result: NotDetec  SARS-CoV-2: NotDetec: This Respiratory Panel uses polymerase chain reaction (PCR) to detect for   adenovirus; coronavirus (HKU1, NL63, 229E, OC43); human metapneumovirus   (hMPV); human enterovirus/rhinovirus (Entero/RV); influenza A; influenza   A/H1; influenza A/H3; influenza A/H1-2009; influenza B; parainfluenza   viruses 1, 2, 3, 4; respiratory syncytial virus; Mycoplasma pneumoniae;   Chlamydophila pneumoniae; and SARS-CoV-2.  Adenovirus (RapRVP): NotDetec  Influenza A (RapRVP): NotDetec  Influenza B (RapRVP): NotDetec  Parainfluenza 1 (RapRVP): NotDetec  Parainfluenza 2 (RapRVP): NotDetec  Parainfluenza 3 (RapRVP): NotDetec  Parainfluenza 4 (RapRVP): NotDetec  Resp Syncytial Virus (RapRVP): NotDetec  Bordetella pertussis (RapRVP): NotDetec  Bordetella parapertussis (RapRVP): NotDetec  Chlamydia pneumoniae (RapRVP): NotDetec  Mycoplasma pneumoniae (RapRVP): NotDetec  Entero/Rhinovirus (RapRVP): NotDetec  HKU1 Coronavirus (RapRVP): NotDetec  NL63 Coronavirus (RapRVP): NotDetec  229E Coronavirus (RapRVP): NotDetec  OC43 Coronavirus (RapRVP): NotDetec  hMPV (RapRVP): NotDetec

## 2025-04-23 NOTE — ED PEDIATRIC TRIAGE NOTE - AVIAN FLU SYMPTOMS
60F PMHx morbid obesity ( BMI 61), HTN, HLD, hx murmur/moderate AS on TTE presenting for hematuria found to have 8cm R kidney mass pending cystoscopy and stent. Med consult for pre-op clearance and co-management    CC: Pt seen w/ Dr. Gao     # Pre-op clearance   METs: <4  RCRI: 0 points class 1 risk of 3.9% for mortality, MI, cardiac arrest in 30 days  WARD: 0.1% risk for mortality, MI, cardiac arrest in 30 days  ECG reviewed: NSR  Echo( outpt) reviewed: Moderate Aortic Stenosis  Patient is low risk for low/moderate risk procedure. No further cardiac work up recommended   Medications to hold perioperatively: Hold HCTZ and Losartan day of procedure, can resume after procedure    # HTN  - cw Carvedilol 6.25mg po q12hr   - hold Losartan 100mg po daily and HCTZ 25mg po daily on the day of procedure ( 4/12) and resume tomorrow   - monitor BP    3 HLD  - c/w Atorvastatin 10mg p qHS     Med consult will follow with you. Thank you. No

## 2025-04-23 NOTE — H&P PEDIATRIC - NSHPPHYSICALEXAM_GEN_ALL_CORE
GENERAL PHYSICAL EXAM  General:        Well nourished, no acute distress  HEENT:         Normocephalic, atraumatic, clear conjunctiva, external ear normal, nasal mucosa normal, oral pharynx clear  Neck:            Supple, full range of motion, no nuchal rigidity  CV:               Regular rate and rhythm, no murmurs. Warm and well perfused.  Respiratory:   Clear to auscultation; Even, nonlabored breathing  Abdominal:    Soft, nontender, nondistended, no masses, no organomegaly  Extremities:    No joint swelling, erythema, tenderness; normal ROM, no contractures  Skin:              No rash, no neurocutaneous stigmata    NEUROLOGIC EXAM  Mental Status:     Oriented to person, place, and date; Good eye contact; follows simple commands  Cranial Nerves:    PERRL, EOMI, no facial asymmetry, V1-V3 intact , symmetric palate, tongue midline.   Eyes:                   Normal: optic discs   Visual Fields:        Full visual field  Motor:                 Full strength 5/5, proximal and distal,  upper and lower extremities, no pronator drift, rapid finger tapping intact, normal tone, no abnormal movements at rest  DTR:                    2+/4 Biceps, Brachioradialis, Triceps Bilateral;  2+/4  Patellar, Ankle bilateral. No clonus.  Babinski:              Plantar reflexes flexion bilaterally  Sensation:            Intact to pain, light touch, temperature and vibration throughout. Negative Romberg  Coordination:       No dysmetria in finger to nose test bilaterally, no ataxia on heel to shin, no dysdiadochokinesia   Gait:                    Normal gait, normal tandem gait, normal toe walking, normal heel walking GENERAL PHYSICAL EXAM  General:        Well nourished, no acute distress  HEENT:         Normocephalic, atraumatic, clear conjunctiva, external ear normal, nasal mucosa normal, oral pharynx clear  Neck:            Supple, full range of motion, no nuchal rigidity  CV:               Regular rate and rhythm, no murmurs. Warm and well perfused.  Respiratory:   Clear to auscultation; Even, nonlabored breathing  Abdominal:    Soft, nontender, nondistended, no masses, no organomegaly  Extremities:    No joint swelling, erythema, tenderness; normal ROM,  Skin:              No rash, no neurocutaneous stigmata    NEUROLOGIC EXAM  Mental Status:     Alert   Cranial Nerves:    PERRL, EOMI, no facial asymmetry, V1-V3 intact , symmetric palate, tongue midline.   Motor:                 Moves all 4 extremities against gravity  DTR:                    2+/4 Biceps  Gait:                    deferred- walks with assistance at baseline

## 2025-04-23 NOTE — PATIENT PROFILE ADULT - HAVE YOU RECENTLY LOST WEIGHT WITHOUT TRYING?
"Time reflects when diagnosis was documented in both MDM as applicable and the Disposition within this note       Time User Action Codes Description Comment    11/19/2024 10:06 AM Brittany Cheema Add [J06.9] Viral URI with cough           ED Disposition       ED Disposition   Discharge    Condition   Good    Date/Time   Tue Nov 19, 2024 10:06 AM    Comment   Arlette Hart discharge to home/self care.                   Assessment & Plan       Medical Decision Making  6-year-old female presenting with her brother who has the same symptoms for evaluation of cough congestion he episodes of vomiting child is very well-appearing on physical exam, pulmonary exam is reassuring, throat exam is reassuring, child has no abdominal pain and is able to tolerate a Pedialyte ice pop in the emergency department, vital signs are stable within normal limits there is no evidence for deep space infection or abnormalities to warrant blood work or imaging at this time.  High suspicion for viral URI, patient's mother educated on symptomatic medications and school note written as requested.    Strict return to ED precautions discussed. Patient and/or family members verbalizes understanding and agrees with plan. Patient is stable for discharge     Portions of the record may have been created with voice recognition software. Occasional wrong word or \"sound a like\" substitutions may have occurred due to the inherent limitations of voice recognition software. Read the chart carefully and recognize, using context, where substitutions have occurred.    Risk  OTC drugs.        ED Course as of 11/19/24 1012   Tue Nov 19, 2024   1008 Patient is tolerating Pedialyte ice pop without difficulty.  Patient was reexamined at this time and was found to be stable for discharge.  Return to emergency department criteria was reviewed with the patient who verbalized understanding and was agreeable to discharge and the treatment plan at this time. " "      Medications - No data to display    ED Risk Strat Scores                                               History of Present Illness       Chief Complaint   Patient presents with    Cough     Cough, \"throwing up phlegm,\" x 2 days.       History reviewed. No pertinent past medical history.   History reviewed. No pertinent surgical history.   History reviewed. No pertinent family history.   Social History     Tobacco Use    Smoking status: Never    Smokeless tobacco: Never      E-Cigarette/Vaping      E-Cigarette/Vaping Substances      I have reviewed and agree with the history as documented.     Patient is a previously healthy 6-year-old female born full-term up-to-date on childhood vaccinations currently in school drinking fluids and urinating as per normal who presents today with her brother who has the same symptoms for evaluation of nasal congestion, mucus production, coughing, scattered episodes of vomiting.  Patient denies any throat pain, ear pain, abdominal pain.  Patient's parents deny any inability to tolerate oral intake.  Patient's report Tylenol has been taking for symptoms however symptoms continue.      Cough  Associated symptoms: rhinorrhea    Associated symptoms: no chest pain, no chills, no ear pain, no fever, no headaches, no rash, no shortness of breath and no sore throat        Review of Systems   Constitutional:  Negative for appetite change, chills and fever.   HENT:  Positive for congestion, postnasal drip and rhinorrhea. Negative for ear pain and sore throat.    Eyes:  Negative for redness.   Respiratory:  Positive for cough. Negative for chest tightness and shortness of breath.    Cardiovascular:  Negative for chest pain.   Gastrointestinal:  Positive for nausea and vomiting. Negative for abdominal pain and diarrhea.   Genitourinary:  Negative for dysuria and hematuria.   Musculoskeletal:  Negative for back pain.   Skin:  Negative for rash.   Neurological:  Negative for dizziness, " syncope, light-headedness and headaches.           Objective       ED Triage Vitals [11/19/24 0929]   Temperature Pulse Blood Pressure Respirations SpO2 Patient Position - Orthostatic VS   98.2 °F (36.8 °C) 85 (!) 98/48 20 98 % Sitting      Temp src Heart Rate Source BP Location FiO2 (%) Pain Score    Oral -- Right arm -- --      Vitals      Date and Time Temp Pulse SpO2 Resp BP Pain Score FACES Pain Rating User   11/19/24 0929 98.2 °F (36.8 °C) 85 98 % 20 98/48 -- -- JN            Physical Exam  Vitals and nursing note reviewed.   Constitutional:       General: She is active.      Appearance: She is well-developed.      Comments: Very well-appearing playful appropriately interactive child   HENT:      Mouth/Throat:      Mouth: Mucous membranes are moist.      Pharynx: Oropharynx is clear.      Comments: Clear oropharynx and posterior oropharynx, no sublingual or submandibular swelling or hypertrophy appreciated.  Patient with a midline uvula.  No tonsillar swelling or exudate appreciated.  Patient is managing oral secretions without difficulty.  No phonation changes.  No submandibular or cervical lymphadenopathy appreciated.  Eyes:      Conjunctiva/sclera: Conjunctivae normal.   Cardiovascular:      Rate and Rhythm: Normal rate and regular rhythm.   Pulmonary:      Effort: Pulmonary effort is normal. No respiratory distress.      Breath sounds: Normal breath sounds.      Comments:  Patient in no respiratory distress, speaking in full sentences, managing oral secretions without difficulty, no accessory muscle use, retractions, or belly breathing noted, no adventitious lung sounds auscultated bilaterally.  Abdominal:      General: There is no distension.      Palpations: Abdomen is soft.      Tenderness: There is no abdominal tenderness.   Skin:     General: Skin is warm and dry.      Capillary Refill: Capillary refill takes less than 2 seconds.      Findings: No rash.   Neurological:      Mental Status: She is  alert.         Results Reviewed       None            No orders to display       Procedures    ED Medication and Procedure Management   Prior to Admission Medications   Prescriptions Last Dose Informant Patient Reported? Taking?   Humidifiers MISC   No No   Sig: Use continuous as needed (cough)   acetaminophen (TYLENOL) 160 mg/5 mL solution   No No   Sig: Take 12.6 mL (403.2 mg total) by mouth every 6 (six) hours as needed for mild pain or moderate pain   clotrimazole (LOTRIMIN) 1 % cream   No No   Sig: Apply topically 2 (two) times a day   ibuprofen (MOTRIN) 100 mg/5 mL suspension   No No   Sig: Take 13.5 mL (270 mg total) by mouth every 6 (six) hours as needed for mild pain or moderate pain   sodium chloride (OCEAN) 0.65 % nasal spray   No No   Si spray into each nostril as needed for congestion (as needed for congestion)      Facility-Administered Medications: None     Patient's Medications   Discharge Prescriptions    ACETAMINOPHEN (TYLENOL) 160 MG/5 ML LIQUID    Take 11.8 mL (377.6 mg total) by mouth every 6 (six) hours as needed for mild pain or fever for up to 7 days       Start Date: 2024End Date: 2024       Order Dose: 377.6 mg       Quantity: 236 mL    Refills: 0    IBUPROFEN (MOTRIN) 100 MG/5 ML SUSPENSION    Take 6.3 mL (126 mg total) by mouth every 6 (six) hours as needed for mild pain       Start Date: 2024End Date: --       Order Dose: 126 mg       Quantity: 237 mL    Refills: 0    ORAL ELECTROLYTES (PEDIALYTE FREEZER POPS) SOLN    Take 1 Units by mouth every 4 (four) hours as needed (hydration)       Start Date: 2024End Date: --       Order Dose: 1 Units       Quantity: 62.5 mL    Refills: 0     No discharge procedures on file.  ED SEPSIS DOCUMENTATION   Time reflects when diagnosis was documented in both MDM as applicable and the Disposition within this note       Time User Action Codes Description Comment    2024 10:06 AM Brittany Cheema Add [J06.9] Viral URI  with cough                  Brittany Cheema PA-C  11/19/24 1018     No (0)

## 2025-04-23 NOTE — H&P PEDIATRIC - HISTORY OF PRESENT ILLNESS
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  His first seizure occurred in August 2015, at age 9. An MRI of the brain at the time was normal and an EEG showed generalized myoclonic electroclinical seizures. In Sept 2016, he developed drop attacks. An EEG at that time showed general onset electroclinical seizures. He has been on several different anticonvulsant medications over the years in an attempt to control his seizures.  He had a VNS placed in Aug 2017 (last changed 3/2025).  Current medications include Onfi 15mg AM/ 20mg PM, Lamotrigine 250mg AM/ 300mg PM as well as Zonisamide 200/250mg.  Onfi dose recommended to increase at last visit 2 weeks ago due to increase seizures but Mother did not increase as he was doing better.  Over the past 24 hours has been having an increase in seizure activity consisting of arm flapping, eyes deviating to the left, and occasional head bobbing".  Family noticed that patient had "greater than 50 episodes of back-to-back seizure episodes" overnight.  Family tried giving intranasal Versed overnight at 3 AM, did not notice any substantial change.  Called EMS who gave intranasal Versed 10 mg at approximately 10:30 AM.  Patient seizures continued, arrived at [facility] where he was loaded with 650 mg of fosphenytoin at approximately 1 PM and transferred [place] [facility] [place].     He has failed several medications for either lack of efficacy or side effects including VPA ( first medicine in 2015), TPM stopped in one month as mother thought it increased seizures, Keppra, then LTG and Onfi added, Tisha's Web CBD oil, ZNS.Clonazepam, Epidiolex, Phenytoin (caused gum swelling). He was on Banzel, felbamate and Fycompa (stopped due to sleepiness as well as increased seizures).    He had VNS in 2017 and his drop seizures stopped for 6 months. 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  His first seizure occurred in August 2015, at age 9. An MRI of the brain at the time was normal and an EEG showed generalized myoclonic electroclinical seizures. In Sept 2016, he developed drop attacks. An EEG at that time showed general onset electroclinical seizures. He has been on several different anticonvulsant medications over the years in an attempt to control his seizures.  He had a VNS placed in Aug 2017 (last changed 3/2025).  Current medications include Onfi 15mg AM/ 20mg PM, Lamotrigine 250mg AM/ 300mg PM as well as Zonisamide 200/250mg.  Onfi dose recommended to increase at last visit 2 weeks ago due to increase seizures but Mother did not increase as he was doing better.  Over the past 24 hours has been having an increase in seizure activity consisting of head drop, head turn to left, staring and stiffening.  Family noticed that patient had "greater than 50 episodes of back-to-back seizure episodes" overnight. Seizures lasted about 1 minute each.   Family tried giving intranasal Versed overnight x 2 at home but did not notice any substantial change.  Called EMS who gave intranasal Versed 10 mg at approximately 10:30 AM.  Patient seizures continued, arrived at [facility] where he was loaded with 650 mg of fosphenytoin at approximately 1 PM and transferred to [facility] for further management.  At baseline, he can have anywhere from 15-20 seizures on a good day but  on a bad day.  "Bad-day's" occur every few weeks sometimes triggered by weather, illness or unknown cause.    He has failed several medications for either lack of efficacy or side effects including VPA ( first medicine in 2015), TPM stopped in one month as mother thought it increased seizures, Keppra, then LTG and Onfi added, Tisha's Web CBD oil, ZNS, .Clonazepam, Epidiolex, Phenytoin (caused gum swelling). He was on Banzel, felbamate, Briviact and Fycompa (stopped due to sleepiness as well as increased seizures).    He had VNS in 2017 and his drop seizures stopped for 6 months. 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

## 2025-04-23 NOTE — ED ADULT NURSE NOTE - NSFALLRISKINTERV_ED_ALL_ED
Assistance OOB with selected safe patient handling equipment if applicable/Assistance with ambulation/Communicate fall risk and risk factors to all staff, patient, and family/Encourage patient to sit up slowly, dangle for a short time, stand at bedside before walking/Monitor gait and stability/Monitor for mental status changes and reorient to person, place, and time, as needed/Provide visual cue: yellow wristband, yellow gown, etc/Reinforce activity limits and safety measures with patient and family/Review medications for side effects contributing to fall risk/Toileting schedule using arm’s reach rule for commode and bathroom/Use of alarms - bed, stretcher, chair and/or video monitoring/Call bell, personal items and telephone in reach/Instruct patient to call for assistance before getting out of bed/chair/stretcher/Non-slip footwear applied when patient is off stretcher/[place] to call system/Physically safe environment - no spills, clutter or unnecessary equipment/Purposeful Proactive Rounding/Room/bathroom lighting operational, light cord in reach

## 2025-04-23 NOTE — PATIENT PROFILE ADULT - FUNCTIONAL ASSESSMENT - BASIC MOBILITY 6.
1-calculated by average/Not able to assess (calculate score using [facility] averaging method)

## 2025-04-23 NOTE — DISCHARGE NOTE PROVIDER - CARE PROVIDER_API CALL
Stephania Segovia  Pediatric Neurology  [address], [address]  [place] [address]  Phone: [phone]  Fax: [phone]  Follow Up Time: 2 weeks

## 2025-04-23 NOTE — ED PROVIDER NOTE - OBJECTIVE STATEMENT
19-year-old male history of autism, epilepsy 19-year-old male history of autism, epilepsy, 19-year-old male history of autism, epilepsy, Lennox Gastaut, chromosome 15 abnormalities, s/p 2nd VNS placement 3/17/25 (1st placed in 2013) presenting for increased seizure frequency. 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 Mother of child noticed that patient was having icnreased seizure frequency more than baseline over past 24 hr.  At baseline, patient has approximately 15-20 episodes of seizures less than 1 minute, often "back-to-back".  Typical seizures involved "arm flapping, eyes deviating to the left, and occasional head bobbing".  Family noticed that patient had "greater than 50 episodes of back-to-back seizure episodes" overnight.  Family tried giving intranasal Versed overnight at 3 AM, did not notice any substantial change.  Called EMS who gave intranasal Versed 10 mg at approximately 10:30 AM.  Patient seizures continued, arrived at [facility] where he was loaded with 650 mg of fosphenytoin at approximately 1 PM.  Started mIVF. Patient had a chest x-ray at [facility] which was clear.  Sent chemistry and CBC that were unremarkable. CK elevated to 505. PT/PTT wnl.   Patient was transferred to [facility] for video EEG and further management with neuro team.  Patient's home medications include lamotrigine 250 mg in the morning and 300 mg before bed, zonisamide 200 mg in the morning and 250 mg before bed, Onfi 15 mg in the morning and 20 mg before bed.  Family states that the seizures have been becoming more frequent "for the past month or so".  Patient's family have concern that he is seizure activity has been "more like grand mal seizures" over the past couple weeks.  Otherwise, patient has been in normal state of health.  No fevers, no nausea vomiting diarrhea, no URI symptoms.  No change in p.o., albeit has not had p.o. today due to postictal state/"drowsiness" after getting fosphenytoin.  At baseline, patient can only ambulate with assistance and is nonverbal. No known sick contacts. No recent travel. NKDA. VUTD. 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 Mother of child noticed that patient was having increased seizure frequency more than baseline over past 24 hr.  At baseline, patient has approximately 15-20 episodes of seizures less than 1 minute, often "back-to-back".  Typical seizures involved "arm flapping, eyes deviating to the left, and occasional head bobbing".  Family noticed that patient had "greater than 50 episodes of back-to-back seizure episodes" overnight.  Family tried giving intranasal Versed overnight at 3 AM, did not notice any substantial change.  Called EMS who gave intranasal Versed 10 mg at approximately 10:30 AM.  Patient seizures continued, arrived at [facility] where he was loaded with 650 mg of fosphenytoin at approximately 1 PM.  Started mIVF. Patient had a chest x-ray at [place] which was clear.  Sent chemistry and CBC that were unremarkable. CK elevated to 505. PT/PTT wnl.   Patient was transferred to [facility] for video EEG and further management with neuro team.  Patient's home medications include lamotrigine 250 mg in the morning and 300 mg before bed, zonisamide 200 mg in the morning and 250 mg before bed, Onfi 15 mg in the morning and 20 mg before bed.  Family states that the seizures have been becoming more frequent "for the past month or so".  Patient's family have concern that he is seizure activity has been "more like grand mal seizures" over the past couple weeks.  Otherwise, patient has been in normal state of health.  No fevers, no nausea vomiting diarrhea, no URI symptoms.  No change in p.o., albeit has not had p.o. today due to postictal state/"drowsiness" after getting fosphenytoin.  At baseline, patient can only ambulate with assistance and is nonverbal. No known sick contacts. No recent travel. NKDA. VUTD.

## 2025-04-23 NOTE — ED ADULT NURSE NOTE - NSICDXPASTSURGICALHX_GEN_ALL_CORE_FT
PAST SURGICAL HISTORY:  History of corpus callostomy corpus callostomy 3/2021    History of dental surgery April 2020    History of MRI MRI brain with sedation on 11/13/20    S/P adenoidectomy 3/31/20 at [place]    S/P endoscopy 2012    Status post VNS (vagus nerve stimulator) placement

## 2025-04-23 NOTE — DISCHARGE NOTE PROVIDER - NSDCMRMEDTOKEN_GEN_ALL_CORE_FT
cloBAZam 10 mg oral tablet: one and a half tab (15 mg) in AM and two tabs (20 mg) in PM  lamoTRIgine 100 mg oral tablet, disintegrating: two and a half tablets (250 mg) in the AM and three tablets (300 mg) in the PM  midazolam 5 mg/inh nasal spray: instill one squirt as directed in one nostril and repeat after 10 minutes for a cluster of seizures MDD:2 squirts  zonisamide 100 mg oral capsule: 2 cap(s) orally 2 times a day  zonisamide 25 mg oral capsule: 2 cap(s) orally once a day (at bedtime) with evening dose for a total of (250 mg)   cloBAZam 20 mg oral tablet: 1 tab(s) orally 2 times a day MDD: 40mg  lamoTRIgine 100 mg oral tablet, disintegrating: orally 2 times a day two and a half tablets (250 mg) in the AM and three tablets (300 mg) in the PM  midazolam 5 mg/inh nasal spray: 1 spray(s) nasal once as needed for seizure cluster Give 1 spray in one nostril for cluster of seizures. Can give 2nd spray (new device) in other nostril if seizure continues 10 minutes after first dose. MDD: 10mg  zonisamide 100 mg oral capsule: 2 cap(s) orally 2 times a day  zonisamide 25 mg oral capsule: 2 cap(s) orally once a day (at bedtime) with evening dose for a total of (250 mg)

## 2025-04-23 NOTE — ED PEDIATRIC TRIAGE NOTE - CHIEF COMPLAINT QUOTE
Transfer from [facility] for 50 seizures overnight as per mother. Patient sleeping, arouses appropriately, easy WOB. 20Gin right forearm and 22G to left AC placed PTA.  PMHx autism, epilepsy, Lennox Gastaut syndrome. SHx VNS placement. NKDA. IUTD.

## 2025-04-23 NOTE — DISCHARGE NOTE PROVIDER - NSDCFUADDINST_GEN_ALL_CORE_FT
Patient may resume all in-home services and outpatient therapies without restrictions.
denies pain/discomfort

## 2025-04-23 NOTE — ED PEDIATRIC NURSE NOTE - CHIEF COMPLAINT QUOTE
Transfer from [facility] for 50 seizures overnight as per mother. Patient sleeping, arouses appropriately, easy WOB. 20Gin right forearm and 22G to left AC placed PTA.  PMHx autism, epilepsy, Lennox Gastaut syndrome. SHx VNS placement. NKDA. IUTD.

## 2025-04-23 NOTE — ED PROVIDER NOTE - PHYSICAL EXAMINATION
General: Tired appearing but opens eyes  HEENT: Airway patent, MMM, EOMI, pupils constricted w/ minimal response to light, eyes clear b/l  CV: Normal S1-S2, no murmurs, rubs or gallops, cap refill <2 sec  Pulm: Clear to auscultation b/l, breath sounds with good aeration bilaterally  Abd: soft, nondistended,  +bs  Neuro: moving all extremities when touched, normal tone  Skin: no cyanosis, no pallor, no rash

## 2025-04-23 NOTE — ED PROVIDER NOTE - CLINICAL SUMMARY MEDICAL DECISION MAKING FREE TEXT BOX
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   Vital signs stable, appears sleepy/postictal.  Will contact neurology team for additional recs.  Will be sending serum levels of home antiepileptics and phenytoin.  Will be put on video EEG, will get RVP. Ordered for PM doses of home AEDs. admitted  under neurology. Parents at bedside and participating in shared decision making. Anthony Martínez MD

## 2025-04-23 NOTE — ED PEDIATRIC NURSE REASSESSMENT NOTE - PAIN RATING/NUMBER SCALE (0-10): ACTIVITY
0 (no pain/absence of nonverbal indicators of pain)
0 (no pain/absence of nonverbal indicators of pain)
seat belt

## 2025-04-23 NOTE — ED ADULT NURSE NOTE - OBJECTIVE STATEMENT
Pt received in T1, parents present at bedside. Pt brought in by EMS from home after having as many as 50 seizures since last night at about 10pm - pt has been taking his medications as appropriate and mother states that she administered his rescue medications without relief of the multiple seizures. EMS witnessed 3 seizures, administered 10 mg Versed intranasally. Pt continuing to have seizures in the ED, not waking up between seizures but mother states that she thinks that is due to the medication EMS gave. Pt followed by Neurology at [facility]. 22G IV in L ac by EMS, labs drawn and sent per orders. IV fluids administered. Awaiting further testing, results, and dispo at this time. Seizure and fall precautions in place.

## 2025-04-23 NOTE — DISCHARGE NOTE PROVIDER - NSDCCPTREATMENT_GEN_ALL_CORE_FT
PRINCIPAL PROCEDURE  Procedure: EEG awake and asleep  Findings and Treatment: multifocal spikes. Two seizures captured.

## 2025-04-23 NOTE — H&P PEDIATRIC - ASSESSMENT
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 He has both medically and surgically refractory epilepsy.  He has failed several medications for either lack of efficacy or side effects including VPA ( first medicine in 2015), TPM, Keppra,  LTG, Onfi, Tisha's Web CBD oil, ZNS. Clonazepam, Epidiolex, Phenytoin, Banzel, felbamate and Fycompa.  He is also s/p VNS in 2017 as well as 2/3 corpus colostomy in 3/2021. Presenting for increased seizures x 24 hours consisting of arm flapping, eyes deviating to the left, and occasional head bobbing.  Loaded with 650 mg of fosphenytoin at approximately 1 PM at [place] [facility] and transferred to [facility] for further management.       Neuro:  - Continue Onfi 15mg AM/ 20mg PM  - Continue Lamotrigine 250mg AM/ 300mg PM   - Continue  Zonisamide 200/250mg  - VEEG monitoring   - Ativan 2mg IV PRN Seizure >5 minutes   - If seizure cluster - Load with Briviact 200mg IV- followed by 100mg BID maintenance dosing     FEN/ GI:  - Reg diet     This patient meets criteria for intractable or poorly controlled epilepsy as he/she/they:   1. Failed to respond to 2 or more appropriately chosen antiseizure medications and are continuing to have seizures despite the parents/caretakers/patient maintaining adherence to the prescribed treatment regimen.   2. Failed to respond to one appropriately chosen medication in the setting of an identified epileptogenic structural lesion of the brain  3. Have experienced poorly controlled seizures in the setting of an underlying static encephalopathy or genetic disorder   4. Have continued to experience seizures on treatment with neurostimulation (VNS) or dietary therapy.      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 He has both medically and surgically refractory epilepsy.  He has failed several medications for either lack of efficacy or side effects including VPA ( first medicine in 2015), TPM, Keppra,  LTG, Onfi, Tisha's Web CBD oil, ZNS. Clonazepam, Epidiolex, Phenytoin, Banzel, felbamate and Fycompa.  He is also s/p VNS in 2017 as well as 2/3 corpus colostomy in 3/2021. Presenting for increased seizures x 24 hours consisting of arm flapping, eyes deviating to the left, and occasional head bobbing.  Loaded with 650 mg of fosphenytoin at approximately 1 PM at [place] [facility] and transferred to [facility] [place].       Neuro:  - Continue Onfi 15mg AM/ 20mg PM  - Continue Lamotrigine 250mg AM/ 300mg PM   - Continue  Zonisamide 200/250mg  - VEEG monitoring   - Ativan 2mg IV PRN Seizure >5 minutes   - If seizure cluster - Load with Briviact 200mg IV- followed by 100mg BID maintenance dosing   - VNS settings checked: Normal:2mA, autostim 2.25mA, Magnet - 2.5 mA.- On time 30 seconds, off time 1.1 minutes.  Duty cycle 35%    FEN/ GI:  - Reg diet     This patient meets criteria for intractable or poorly controlled epilepsy as he/she/they:   1. Failed to respond to 2 or more appropriately chosen antiseizure medications and are continuing to have seizures despite the parents/caretakers/patient maintaining adherence to the prescribed treatment regimen.   2. Failed to respond to one appropriately chosen medication in the setting of an identified epileptogenic structural lesion of the brain  3. Have experienced poorly controlled seizures in the setting of an underlying static encephalopathy or genetic disorder   4. Have continued to experience seizures on treatment with neurostimulation (VNS) or dietary therapy.      Case seen and discussed with Neurology attending,

## 2025-04-23 NOTE — DISCHARGE NOTE PROVIDER - HOSPITAL COURSE
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  His first seizure occurred in August 2015, at age 9. An MRI of the brain at the time was normal and an EEG showed generalized myoclonic electroclinical seizures. In Sept 2016, he developed drop attacks. An EEG at that time showed general onset electroclinical seizures. He has been on several different anticonvulsant medications over the years in an attempt to control his seizures.  He had a VNS placed in Aug 2017 (last changed 3/2025).  Current medications include Onfi 15mg AM/ 20mg PM, Lamotrigine 250mg AM/ 300mg PM as well as Zonisamide 200/250mg.  Onfi dose recommended to increase at last visit 2 weeks ago due to increase seizures but Mother did not increase as he was doing better.  Over the past 24 hours has been having an increase in seizure activity consisting of head drop, head turn to left, staring and stiffening.  Family noticed that patient had "greater than 50 episodes of back-to-back seizure episodes" overnight. Seizures lasted about 1 minute each.   Family tried giving intranasal Versed overnight x 2 at home but did not notice any substantial change.  Called EMS who gave intranasal Versed 10 mg at approximately 10:30 AM.  Patient seizures continued, arrived at [facility] where he was loaded with 650 mg of fosphenytoin at approximately 1 PM and transferred to [facility] [place] for further management.  At baseline, he can have anywhere from 15-20 seizures on a good day but  on a bad day.  "Bad-day's" occur every few weeks sometimes triggered by weather, illness or unknown cause.    He has failed several medications for either lack of efficacy or side effects including VPA ( first medicine in 2015), TPM stopped in one month as mother thought it increased seizures, Keppra, then LTG and Onfi added, Tisha's Web CBD oil, ZNS, .Clonazepam, Epidiolex, Phenytoin (caused gum swelling). He was on Banzel, felbamate, Briviact and Fycompa (stopped due to sleepiness as well as increased seizures).    He had VNS in 2017 and his drop seizures stopped for 6 months. 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 The patient continued to tolerate oral intake with adequate output. The patient remained well-appearing, with no (new) concerning findings noted on physical exam. Care plan, expected course, anticipatory guidance, and strict return precautions discussed in great detail with caregivers, who endorsed understanding. Questions and concerns at the time were addressed. The patient was deemed stable for discharge home with recommended follow-up with their primary care physician in 1-2 days.     <<No medications indicated at time of discharge. 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  His first seizure occurred in August 2015, at age 9. An MRI of the brain at the time was normal and an EEG showed generalized myoclonic electroclinical seizures. In Sept 2016, he developed drop attacks. An EEG at that time showed general onset electroclinical seizures. He has been on several different anticonvulsant medications over the years in an attempt to control his seizures.  He had a VNS placed in Aug 2017 (last changed 3/2025).  Current medications include Onfi 15mg AM/ 20mg PM, Lamotrigine 250mg AM/ 300mg PM as well as Zonisamide 200/250mg.  Onfi dose recommended to increase at last visit 2 weeks ago due to increase seizures but Mother did not increase as he was doing better.  Over the past 24 hours has been having an increase in seizure activity consisting of head drop, head turn to left, staring and stiffening.  Family noticed that patient had "greater than 50 episodes of back-to-back seizure episodes" overnight. Seizures lasted about 1 minute each.   Family tried giving intranasal Versed overnight x 2 at home but did not notice any substantial change.  Called EMS who gave intranasal Versed 10 mg at approximately 10:30 AM.  Patient seizures continued, arrived at [facility] where he was loaded with 650 mg of fosphenytoin at approximately 1 PM and [place] [facility] [place].  At baseline, he can have anywhere from 15-20 seizures on a good day but  on a bad day.  "Bad-day's" occur every few weeks sometimes triggered by weather, illness or unknown cause.    He has failed several medications for either lack of efficacy or side effects including VPA ( first medicine in 2015), TPM stopped in one month as mother thought it increased seizures, Keppra, then LTG and Onfi added, Tisha's Web CBD oil, ZNS, .Clonazepam, Epidiolex, Phenytoin (caused gum swelling). He was on Banzel, felbamate, Briviact and Fycompa (stopped due to sleepiness as well as increased seizures).    He had VNS in 2017 and his drop seizures stopped for 6 months. 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 vEEG (4/23-4/24) with multifocal spikes; 2 seizures captured. Push button events captured. Several without EEG correlate. Patient overall much improved since presentation to ED. Family feels patient has been at his baseline since arriving to neuroscience unit and they are comfortable taking patient home. Will increase home Onfi to 20mg BID (~0.9mg/kg). All other home ASMs to stay the same: Lamotrigine 250mg/300mg and Zonisamide 200mg/250mg. Discharged home to follow up outpt with Dr. Segovia.       On day of discharge, vital signs were reviewed and remained within acceptable range. The patient continued to tolerate oral intake with adequate output. The patient remained well-appearing, with no (new) concerning findings noted on physical exam. Care plan, expected course, anticipatory guidance, and strict return precautions discussed in great detail with caregivers, who endorsed understanding. Questions and concerns at the time were addressed. The patient was deemed stable for discharge home with recommended follow-up with their primary care physician in 1-2 days.     <<Patient may resume all in-home services and outpatient therapies without restrictions.>>    Discharge Vitals:  Vital Signs Last 24 Hrs  T(C): 36.6 (24 Apr 2025 10:17), Max: 36.8 (23 Apr 2025 20:33)  T(F): 97.8 (24 Apr 2025 10:17), Max: 98.2 (23 Apr 2025 20:33)  HR: 90 (24 Apr 2025 10:17) (72 - 98)  BP: 98/59 (24 Apr 2025 10:17) (87/49 - 107/68)  BP(mean): 72 (24 Apr 2025 10:17) (61 - 76)  RR: 18 (24 Apr 2025 10:17) (18 - 21)  SpO2: 100% (24 Apr 2025 10:17) (96% - 100%)    Parameters below as of 24 Apr 2025 06:10  Patient On (Oxygen Delivery Method): room air    Discharge physical:   General:        Thin, Well nourished, no acute distress  HEENT:         Normocephalic, atraumatic  Neck:            Supple, full range of motion  CV:               Warm and well perfused.  Respiratory:   Even, nonlabored breathing on room air  Skin:             warm, dry     NEUROLOGIC EXAM  Mental Status:     Awake, Alert. Tracks ipad.  +stereotypies. Some vocalization appreciated, no words or approximations. (non-verbal at baseline).   Cranial Nerves:    PERRL, EOMI, no facial asymmetry, TUP midline.   Motor:                 Moves all 4 extremities against gravity. Holds ipad with b/l hands.  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  His first seizure occurred in August 2015, at age 9. An MRI of the brain at the time was normal and an EEG showed generalized myoclonic electroclinical seizures. In Sept 2016, he developed drop attacks. An EEG at that time showed general onset electroclinical seizures. He has been on several different anticonvulsant medications over the years in an attempt to control his seizures.  He had a VNS placed in Aug 2017 (last changed 3/2025).  Current medications include Onfi 15mg AM/ 20mg PM, Lamotrigine 250mg AM/ 300mg PM as well as Zonisamide 200/250mg.  Onfi dose recommended to increase at last visit 2 weeks ago due to increase seizures but Mother did not increase as he was doing better.  Over the past 24 hours has been having an increase in seizure activity consisting of head drop, head turn to left, staring and stiffening.  Family noticed that patient had "greater than 50 episodes of back-to-back seizure episodes" overnight. Seizures lasted about 1 minute each.   Family tried giving intranasal Versed overnight x 2 at home but did not notice any substantial change.  Called EMS who gave intranasal Versed 10 mg at approximately 10:30 AM.  Patient seizures continued, arrived at [facility] where he was loaded with 650 mg of fosphenytoin at approximately 1 PM and transferred to [facility] [place] management.  At baseline, he can have anywhere from 15-20 seizures on a good day but  on a bad day.  "Bad-day's" occur every few weeks sometimes triggered by weather, illness or unknown cause.    He has failed several medications for either lack of efficacy or side effects including VPA ( first medicine in 2015), TPM stopped in one month as mother thought it increased seizures, Keppra, then LTG and Onfi added, Tisha's Web CBD oil, ZNS, .Clonazepam, Epidiolex, Phenytoin (caused gum swelling). He was on Banzel, felbamate, Briviact and Fycompa (stopped due to sleepiness as well as increased seizures).    He had VNS in 2017 and his drop seizures stopped for 6 months. 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 vEEG (4/23-4/24) with multifocal spikes; 2 seizures captured. Push button events captured. Several without EEG correlate. Patient overall much improved since presentation to ED. Family feels patient has been at his baseline since arriving to neuroscience unit and they are comfortable taking patient home. Will increase home Onfi to 20mg BID (~0.9mg/kg). All other home ASMs to stay the same: Lamotrigine 250mg/300mg and Zonisamide 200mg/250mg. Discharged home to follow up outpt with Dr. Segovia.       On day of discharge, vital signs were reviewed and remained within acceptable range. The patient continued to tolerate oral intake with adequate output. The patient remained well-appearing, with no (new) concerning findings noted on physical exam. Care plan, expected course, anticipatory guidance, and strict return precautions discussed in great detail with caregivers, who endorsed understanding. Questions and concerns at the time were addressed. The patient was deemed stable for discharge home with recommended follow-up with their primary care physician in 1-2 days.     <<Patient may resume all in-home services and outpatient therapies without restrictions.>>    Discharge Vitals:  Vital Signs Last 24 Hrs  T(C): 36.6 (24 Apr 2025 10:17), Max: 36.8 (23 Apr 2025 20:33)  T(F): 97.8 (24 Apr 2025 10:17), Max: 98.2 (23 Apr 2025 20:33)  HR: 90 (24 Apr 2025 10:17) (72 - 98)  BP: 98/59 (24 Apr 2025 10:17) (87/49 - 107/68)  BP(mean): 72 (24 Apr 2025 10:17) (61 - 76)  RR: 18 (24 Apr 2025 10:17) (18 - 21)  SpO2: 100% (24 Apr 2025 10:17) (96% - 100%)    Parameters below as of 24 Apr 2025 06:10  Patient On (Oxygen Delivery Method): room air    Discharge physical:   General:        Thin, Well nourished, no acute distress  HEENT:         Normocephalic, atraumatic  Neck:            Supple, full range of motion  CV:               Warm and well perfused.  Respiratory:   Even, nonlabored breathing on room air  Skin:             warm, dry     NEUROLOGIC EXAM  Mental Status:     Awake, Alert. Tracks ipad.  +stereotypies. Some vocalization appreciated, no words or approximations. (non-verbal at baseline).   Cranial Nerves:    PERRL, EOMI, no facial asymmetry, TUP midline.   Motor:                 Moves all 4 extremities against gravity.   Coordination:       reaches for/Holds ipad with b/l hands.  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  His first seizure occurred in August 2015, at age 9. An MRI of the brain at the time was normal and an EEG showed generalized myoclonic electroclinical seizures. In Sept 2016, he developed drop attacks. An EEG at that time showed general onset electroclinical seizures. He has been on several different anticonvulsant medications over the years in an attempt to control his seizures.  He had a VNS placed in Aug 2017 (last changed 3/2025).  Current medications include Onfi 15mg AM/ 20mg PM, Lamotrigine 250mg AM/ 300mg PM as well as Zonisamide 200/250mg.  Onfi dose recommended to increase at last visit 2 weeks ago due to increase seizures but Mother did not increase as he was doing better.  Over the past 24 hours has been having an increase in seizure activity consisting of head drop, head turn to left, staring and stiffening.  Family noticed that patient had "greater than 50 episodes of back-to-back seizure episodes" overnight. Seizures lasted about 1 minute each.   Family tried giving intranasal Versed overnight x 2 at home but did not notice any substantial change.  Called EMS who gave intranasal Versed 10 mg at approximately 10:30 AM.  Patient seizures continued, arrived at [facility] where he was loaded with 650 mg of fosphenytoin at approximately 1 PM and transferred to [facility] [place] for further management.  At baseline, he can have anywhere from 15-20 seizures on a good day but  on a bad day.  "Bad-day's" occur every few weeks sometimes triggered by weather, illness or unknown cause.    He has failed several medications for either lack of efficacy or side effects including VPA ( first medicine in 2015), TPM stopped in one month as mother thought it increased seizures, Keppra, then LTG and Onfi added, Tisha's Web CBD oil, ZNS, .Clonazepam, Epidiolex, Phenytoin (caused gum swelling). He was on Banzel, felbamate, Briviact and Fycompa (stopped due to sleepiness as well as increased seizures).    He had VNS in 2017 and his drop seizures stopped for 6 months. 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 vEEG (4/23-4/24) with multifocal spikes; 2 seizures captured. Push button events captured. Several without EEG correlate. Patient overall much improved since presentation to ED. Family feels patient has been at his baseline since arriving to neuroscience unit and they are comfortable taking patient home. Will increase home Onfi to 20mg BID (~0.9mg/kg). All other home ASMs to stay the same: Lamotrigine 250mg/300mg and Zonisamide 200mg/250mg. Discharged home to follow up outpt with Dr. Segovia.       On day of discharge, vital signs were reviewed and remained within acceptable range. The patient continued to tolerate oral intake with adequate output. The patient remained well-appearing, with no (new) concerning findings noted on physical exam. Care plan, expected course, anticipatory guidance, and strict return precautions discussed in great detail with caregivers, who endorsed understanding. Questions and concerns at the time were addressed. The patient was deemed stable for discharge home with recommended follow-up with their primary care physician in 1-2 days.     <<Patient may resume all in-home services and outpatient therapies without restrictions.>>    Discharge Vitals:  Vital Signs Last 24 Hrs  T(C): 36.6 (24 Apr 2025 10:17), Max: 36.8 (23 Apr 2025 20:33)  T(F): 97.8 (24 Apr 2025 10:17), Max: 98.2 (23 Apr 2025 20:33)  HR: 90 (24 Apr 2025 10:17) (72 - 98)  BP: 98/59 (24 Apr 2025 10:17) (87/49 - 107/68)  BP(mean): 72 (24 Apr 2025 10:17) (61 - 76)  RR: 18 (24 Apr 2025 10:17) (18 - 21)  SpO2: 100% (24 Apr 2025 10:17) (96% - 100%)    Parameters below as of 24 Apr 2025 06:10  Patient On (Oxygen Delivery Method): room air    Discharge physical:   General:        Thin, Well nourished, no acute distress  HEENT:         Normocephalic, atraumatic  Neck:            Supple, full range of motion  CV:               Warm and well perfused.  Respiratory:   Even, nonlabored breathing on room air  Skin:             warm, dry     NEUROLOGIC EXAM  Mental Status:     Awake, Alert. Tracks ipad.  +stereotypies. Some vocalization appreciated, no words or approximations. (non-verbal at baseline).   Cranial Nerves:    PERRL, EOMI, no facial asymmetry, TUP midline.   Motor:                 Moves all 4 extremities against gravity.   Coordination:       reaches for/Holds ipad with b/l hands.  Gait:                    deferred- walks with assistance at baseline    I was physically present for key portions of the evaluation and management (E/M) service provided. I agree with the history, physical examination, assessment and plan as written. All edits/revisions/additions were made to the document.    Luciano Courtney MD  Attending Physician  Pediatric Neurology/Epilepsy

## 2025-04-23 NOTE — ED PROVIDER NOTE - DIFFERENTIAL DIAGNOSIS
Differential Diagnosis Rule out acute status epilepticus, electrolyte abnormality, leukocytosis, other acute pathology

## 2025-04-23 NOTE — H&P PEDIATRIC - NSICDXPASTSURGICALHX_GEN_ALL_CORE_FT
PAST SURGICAL HISTORY:  History of corpus callostomy corpus callostomy 3/2021    History of dental surgery April 2020    History of MRI MRI brain with sedation on 11/13/20    S/P adenoidectomy 3/31/20 at [place]    S/P endoscopy 2012    Status post VNS (vagus nerve stimulator) placement

## 2025-04-23 NOTE — ED ADULT TRIAGE NOTE - CHIEF COMPLAINT QUOTE
patient brought in by ems- for multiple seizures, which started at 10pm , generalised tonic clonic seizures- one every 15minutes- as stated by ems- -patient was given inj versed 10mg intranasaly by ems- last dose of versed given by mother was at 330am- BGL- 103. patient has an IV #22 on the left AC inserted by ems- patient on lamictal, onfi and zonisamide

## 2025-04-23 NOTE — ED PROVIDER NOTE - CLINICAL SUMMARY MEDICAL DECISION MAKING FREE TEXT BOX
Acute seizures/status epilepticus today.  Currently not seizing.  Will check labs, x-ray, IV, possible meds, admission/transfer

## 2025-04-23 NOTE — ED PROVIDER NOTE - NSICDXPASTSURGICALHX_GEN_ALL_CORE_FT
PAST SURGICAL HISTORY:  History of corpus callostomy corpus callostomy 3/2021    History of dental surgery April 2020    History of MRI MRI brain with sedation on 11/13/20    S/P adenoidectomy 3/31/20 at [place]    S/P endoscopy 2012    Status post VNS (vagus nerve stimulator) placement

## 2025-04-23 NOTE — ED PROVIDER NOTE - OBJECTIVE STATEMENT
19-year-old male with a history of autism, nonverbal, seizure disorder with usual symptoms of multiple/many seizures per day.  However since last night the patient is had on and off constant seizures, with a total of close to 50 seizures since last night.  EMS states that they witnessed 3.  The patient was given some nasal Versed by mom around 3:30 AM.  EMS gave 10 mg of nasal Versed as well enroute.  The patient did not have any recent fall or illness.  No cough/URI.  No recent trauma.  No aggravating or alleviating factors otherwise noted.  No other history available.

## 2025-04-23 NOTE — ED PROVIDER NOTE - ATTENDING CONTRIBUTION TO CARE
18y/o with intractable epilepsy, autism, transferred from OSH for further management of seizure, s/p versed x2, s/p fospheyntoin load at other hospital. Arrives post ictal and tired s/p multiple AEDs. Hemodynamically stable, protecting airway, gag reflex intact, responding to painful stimuli, no increased tone. No signs/features of status epilepticus, neck supple, no meningismus. Admission pending, will discuss with neuro additional AED and EEG monitoring.    Additional medical decision making as documented by myself and/or PA/NP/resident/fellow in patient's chart. - Wanda Villanueva MD

## 2025-04-23 NOTE — H&P PEDIATRIC - NS ATTEND AMEND GEN_ALL_CORE FT
From: Kasie Tolentino  To: Louisa Padilla  Sent: 10/11/2021 1:51 PM CDT  Subject: Refill    Good afternoon Dr. Padilla and staff,    I will need a new prescription for the Januvia since the dose was increased to 100mg daily. I have enough till Thursday as I only have one pill left for Thursday morning of the 50mg tablets.    My pharmacy is [facility] in [facility] [place] on [facility].    Thank you and have a great day, Kasie  
A complete review of available medical records and pertinent medical literature, elicitation of history, neurological examination, review of any paraclinical studies including laboratory studies, neuroimaging and electroencephalographic recordings if performed, discussion of diagnostic evaluation and treatment plan with parent(s), and/or care provider(s) and/or house staff was conducted as appropriate.

## 2025-04-23 NOTE — ED PEDIATRIC NURSE REASSESSMENT NOTE - NS ED NURSE REASSESS COMMENT FT2
Pt resting comfortably in bed with no apparent signs of distress and parent at bedside, age appropriate behavior noted. VS as per flowsheet, pt remains on cardiac monitor and cont pulse ox and veeg. Pain reassessed. Family/pt updated on POC. Assessment ongoing.
pt post ictal but arousable. VS as per flowsheet, pt remains on cardiac monitor and cont pulse ox and VEEG. Pain reassessed. Family/pt updated on POC. Assessment ongoing.

## 2025-04-23 NOTE — ED PROVIDER NOTE - PROGRESS NOTE DETAILS
spoke w/ neurology; plan for ativan 2mg IV push if sz lasting >3min. can try ativan x2, then depakote and call neurology. ordered PM medications Anthony Martínez MD pt's mental status improving (now looks around room, opens eyes on own, moving hands, making noise, watching TV). pt will go to neurosci floor on veeg. Anthony Martínez MD

## 2025-04-23 NOTE — ED PROVIDER NOTE - NSICDXPASTSURGICALHX_GEN_ALL_CORE_FT
PAST SURGICAL HISTORY:  History of corpus callostomy corpus callostomy 3/2021    History of dental surgery April 2020    History of MRI MRI brain with sedation on 11/13/20    S/P adenoidectomy 3/31/20 at [place]    S/P endoscopy 2012    Status post VNS (vagus nerve stimulator) placement

## 2025-04-23 NOTE — ED PROVIDER NOTE - PROGRESS NOTE DETAILS
Discussed with the patient's neurologist, Dr. Segovia.  She states that we should give a loading dose of fosphenytoin, should transfer to [facility] for continuous EEG monitoring. Discussed with patient's parents, the patient reportedly had up to 5 short less than 1 minute seizures while in the ED.  Awaiting fosphenytoin from the pharmacy.  Discussed with the patient's parents regarding the nature of his symptoms, and need for transfer.  They agree with the plan. Discussed with [facility]'s transfer center, they have accepted the patient to [facility]'s ED, Dr. Wanda Dexter.  Tier 1. Patient doing well, the parents state that he is occasionally having his very short episodes of slight shaking, but not having full tonic-clonic seizures anymore.  We will continue to monitor.  Awaiting EMS EMS at the bedside, awaiting transport

## 2025-04-23 NOTE — ED PROVIDER NOTE - MDM ORDERS SUBMITTED SELECTION
[Vasectomy (partner)] : has a partner with a vasectomy [Y] : Patient is sexually active [Normal Amount/Duration] :  normal amount and duration [Regular Cycle Intervals] : periods have been regular [No] : Patient does not have concerns regarding sex [Currently Active] : currently active [FreeTextEntry1] : 46 yo  presents annual exam w/o complaints. Patient states her menstrual cycles are shortnening in interval with normal flow. [Mammogramdate] : 2 [PapSmeardate] : 2 [PGHxTotal] : 5 [[facility]] : 5 [[facility]] : 4 Imaging Studies/Medications

## 2025-04-23 NOTE — DISCHARGE NOTE PROVIDER - NSDCCPCAREPLAN_GEN_ALL_CORE_FT
PRINCIPAL DISCHARGE DIAGNOSIS  Diagnosis: Seizures  Assessment and Plan of Treatment:      PRINCIPAL DISCHARGE DIAGNOSIS  Diagnosis: Seizures  Assessment and Plan of Treatment: CARE DURING SEIZURES — If you witness your child's seizure, it is important to prevent the child from harming him or herself.  - Place the child on their side to keep the throat clear and allow secretions (saliva or vomit) to drain. Do not try to stop the child's movements or convulsions. Do not put anything in the child's mouth, and do not try to hold the tongue. It is not possible to swallow the tongue, although some children may bite their tongue during a seizure, which can cause bleeding. If this happens, it usually does not cause serious harm.  - Keep an eye on a clock or watch.  - Move the child away from potential hazards, such as a stove, furniture, stairs, or traffic.  - Stay with the child until the seizure ends. Allow the child to sleep after the seizure if he/she is tired. Explain what happened and reassure the child that they are safe when they awaken.  SEIZURE PRECAUTIONS  - Avoid any activity that can result in a fall if the child has a seizure during the activity  - Avoid heights above 3 feet  - If the child is around water, in a tub, or swimming, make sure there is one person responsible for watching the child. If they have a seizure while swimming, they are at risk for drowning

## 2025-04-24 ENCOUNTER — TRANSCRIPTION ENCOUNTER (OUTPATIENT)
Age: 20
End: 2025-04-24

## 2025-04-24 VITALS
SYSTOLIC BLOOD PRESSURE: 98 MMHG | HEART RATE: 90 BPM | RESPIRATION RATE: 18 BRPM | OXYGEN SATURATION: 100 % | DIASTOLIC BLOOD PRESSURE: 59 MMHG | TEMPERATURE: 98 F

## 2025-04-24 PROCEDURE — 95720 EEG PHY/QHP EA INCR W/VEEG: CPT

## 2025-04-24 PROCEDURE — 99236 HOSP IP/OBS SAME DATE HI 85: CPT

## 2025-04-24 RX ORDER — CLOBAZAM 20 MG/1
1 TABLET ORAL
Qty: 60 | Refills: 0
Start: 2025-04-24 | End: 2025-05-23

## 2025-04-24 RX ORDER — CLOBAZAM 20 MG/1
0 TABLET ORAL
Refills: 0 | DISCHARGE

## 2025-04-24 RX ORDER — LAMOTRIGINE 150 MG/1
300 TABLET ORAL AT BEDTIME
Refills: 0 | Status: DISCONTINUED | OUTPATIENT
Start: 2025-04-24 | End: 2025-04-24

## 2025-04-24 RX ORDER — LAMOTRIGINE 150 MG/1
250 TABLET ORAL DAILY
Refills: 0 | Status: DISCONTINUED | OUTPATIENT
Start: 2025-04-24 | End: 2025-04-24

## 2025-04-24 RX ORDER — LAMOTRIGINE 150 MG/1
0 TABLET ORAL
Qty: 0 | Refills: 0 | DISCHARGE

## 2025-04-24 RX ORDER — MIDAZOLAM IN 0.9 % SOD.CHLORID 1 MG/ML
0 PLASTIC BAG, INJECTION (ML) INTRAVENOUS
Refills: 0 | DISCHARGE

## 2025-04-24 RX ORDER — MIDAZOLAM IN 0.9 % SOD.CHLORID 1 MG/ML
1 PLASTIC BAG, INJECTION (ML) INTRAVENOUS
Qty: 1 | Refills: 0
Start: 2025-04-24 | End: 2025-05-03

## 2025-04-24 RX ADMIN — SODIUM CHLORIDE 82 MILLILITER(S): 9 INJECTION, SOLUTION INTRAVENOUS at 09:35

## 2025-04-24 RX ADMIN — CLOBAZAM 15 MILLIGRAM(S): 20 TABLET ORAL at 07:49

## 2025-04-24 RX ADMIN — SODIUM CHLORIDE 82 MILLILITER(S): 9 INJECTION, SOLUTION INTRAVENOUS at 07:08

## 2025-04-24 RX ADMIN — LAMOTRIGINE 250 MILLIGRAM(S): 150 TABLET ORAL at 08:49

## 2025-04-24 RX ADMIN — Medication 200 MILLIGRAM(S): at 07:50

## 2025-04-24 NOTE — EEG REPORT - NS EEG TEXT BOX
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     Medications:   cloBAZam Oral Tab/Cap - Peds 15 milliGRAM(s) Oral <User Schedule>  cloBAZam Oral Tab/Cap - Peds 20 milliGRAM(s) Oral <User Schedule>  diazepam Rectal Gel - Peds 10 milliGRAM(s) Rectal once PRN  lamoTRIgine  Oral Tab/Cap - Peds 250 milliGRAM(s) Oral daily  lamoTRIgine  Oral Tab/Cap - Peds 300 milliGRAM(s) Oral at bedtime  LORazepam IV Push - Peds 2 milliGRAM(s) IV Push once PRN  zonisamide Oral Tab/Cap - Peds 250 milliGRAM(s) Oral at bedtime  zonisamide Oral Tab/Cap - Peds 200 milliGRAM(s) Oral <User Schedule>  ___________________________________________________________________________  Recording Technique:     The patient underwent continuous Video/EEG monitoring using a cable telemetry system [employer].  The EEG was recorded from 21 electrodes using the standard 10/20 placement, with EKG.  Time synchronized digital video recording was done simultaneously with EEG recording.    The EEG was continuously sampled on disk, and spike detection and seizure detection algorithms marked portions of the EEG for further analysis offline.  Video data was stored on disk for important clinical events (indicated by manual pushbutton) and for periods identified by the seizure detection algorithm, and analyzed offline.      Video and EEG data were reviewed by the electroencephalographer on a daily basis, and selected segments were archived on compact disc.      The patient was attended by an EEG technician for eight to ten hours per day.  Patients were observed by the epilepsy nursing staff 24 hours per day.  The epilepsy center neurologist was available in person or on call 24 hours per day during the period of monitoring.    ___________________________________________________________________________    Background:  Predominant background c/o diffuse delta and theta slowing, at times with overriding beta activity. There was no clearly defined PDR or AP gradient present. Paucity of normal sleep architecture.     Slowing:  Generalized slowing as noted above.     Attenuation and Asymmetry: None.    Interictal Activity:    Abundant multifocal spike and polyspike wave complexes, that at times appeared most prevalent over the bifrontal regions, occurring in brief runs.      Patient Events/ Ictal Activity: Two electroclinical seizures present, with initial seizure characterized at onset by sentinal spike f/b diffuse attenuation, evolving to rhythmic fast activity diffusely (higher amplitude over the left hemisphere) and then polyspike wave activity that persists over the right frontal region,  offset unclear. Clinically, patient is noted to stiffen with head version to the left. The second seizure has largely the same features, with exception of a clear electrographic offset, with diffuse attenuation. See timings below:     d1 18:02:41		onset sz 1  d1 18:02:41		sent spike  d1 18:02:41		diff atten  d1 18:02:43		rhy fast  d1 18:02:43		higher amp spikes left  d1 18:02:45		CLINICAL head dev to left  d1 18:02:49		CLINICAL off  d1 18:02:53		spike wave right continues  d1 18:15:24		alert and interactive    d1 20:27:49		onset  d1 20:27:49		sent spike  d1 20:27:50		CLINICAL tonic movements  d1 20:27:51		rythmic fast  d1 20:27:54		diffuse spike wave activity  d1 20:27:55		CLINICAL subtle clonic movements head, UE  d1 20:28:07		diffuse attenuation    Activation Procedures:  Not performed.     EKG:  No clear abnormalities were noted.     Impression:  This is an abnormal video EEG study:   - Two electroclinical tonic seizures captured, with generalized onset as described above.   - Abundant multifocal spike and polyspike wave complexes, that at times appeared most prevalent over the bifrontal regions, occurring in brief runs.   - Background disorganization.     Clinical Correlation:  The EEG findings are diagnostic of tonic seizures, with onset generalized for two captured episodes, but interictal findings indicative of a tendency for seizures to originate from multiple foci. Background findings and disorganization are consistent with an epileptic encephalopathy.     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     Medications:   cloBAZam Oral Tab/Cap - Peds 15 milliGRAM(s) Oral <User Schedule>  cloBAZam Oral Tab/Cap - Peds 20 milliGRAM(s) Oral <User Schedule>  diazepam Rectal Gel - Peds 10 milliGRAM(s) Rectal once PRN  lamoTRIgine  Oral Tab/Cap - Peds 250 milliGRAM(s) Oral daily  lamoTRIgine  Oral Tab/Cap - Peds 300 milliGRAM(s) Oral at bedtime  LORazepam IV Push - Peds 2 milliGRAM(s) IV Push once PRN  zonisamide Oral Tab/Cap - Peds 250 milliGRAM(s) Oral at bedtime  zonisamide Oral Tab/Cap - Peds 200 milliGRAM(s) Oral <User Schedule>  ___________________________________________________________________________  Recording Technique:     The patient underwent continuous Video/EEG monitoring using a cable telemetry system [employer].  The EEG was recorded from 21 electrodes using the standard 10/20 placement, with EKG.  Time synchronized digital video recording was done simultaneously with EEG recording.    The EEG was continuously sampled on disk, and spike detection and seizure detection algorithms marked portions of the EEG for further analysis offline.  Video data was stored on disk for important clinical events (indicated by manual pushbutton) and for periods identified by the seizure detection algorithm, and analyzed offline.      Video and EEG data were reviewed by the electroencephalographer on a daily basis, and selected segments were archived on compact disc.      The patient was attended by an EEG technician for eight to ten hours per day.  Patients were observed by the epilepsy nursing staff 24 hours per day.  The epilepsy center neurologist was available in person or on call 24 hours per day during the period of monitoring.    ___________________________________________________________________________    Background:  Predominant background c/o diffuse delta and theta slowing, at times with overriding beta activity. There was no clearly defined PDR or AP gradient present. Paucity of normal sleep architecture.     Slowing:  Generalized slowing as noted above.     Attenuation and Asymmetry: None.    Interictal Activity:    Abundant multifocal spike and polyspike wave complexes, that at times appeared most prevalent over the bifrontal regions, occurring in brief runs.      Patient Events/ Ictal Activity: Two electroclinical seizures present, with initial seizure characterized at onset by sentinal spike f/b diffuse attenuation, evolving to rhythmic fast activity diffusely (higher amplitude over the left hemisphere) and then polyspike wave activity that persists over the right frontal region,  offset unclear. Clinically, patient is noted to stiffen with head version to the left. The second seizure has largely the same features, with exception of a clear electrographic offset, with diffuse attenuation. See timings below:     d1 18:02:41		onset sz 1  d1 18:02:41		sent spike  d1 18:02:41		diff atten  d1 18:02:43		rhy fast  d1 18:02:43		higher amp spikes left  d1 18:02:45		CLINICAL head dev to left  d1 18:02:49		CLINICAL off  d1 18:02:53		spike wave right continues  d1 18:15:24		alert and interactive    d1 20:27:49		onset  d1 20:27:49		sent spike  d1 20:27:50		CLINICAL tonic movements  d1 20:27:51		rythmic fast  d1 20:27:54		diffuse spike wave activity  d1 20:27:55		CLINICAL subtle clonic movements head, UE  d1 20:28:07		diffuse attenuation    Activation Procedures:  Not performed.     EKG:  No clear abnormalities were noted.     Impression:  This is an abnormal video EEG study:   - Two electroclinical tonic seizures captured, with generalized onset as described above.   - Abundant multifocal spike and polyspike wave complexes, that at times appeared most prevalent over the bifrontal regions, occurring in brief runs.   - Background disorganization.     Clinical Correlation:  The EEG findings are diagnostic of tonic seizures, with onset generalized for two captured episodes, but interictal findings indicative of a tendency for seizures to originate from multiple foci. Background findings and disorganization are consistent with an epileptic encephalopathy.     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     Medications:   cloBAZam Oral Tab/Cap - Peds 15 milliGRAM(s) Oral <User Schedule>  cloBAZam Oral Tab/Cap - Peds 20 milliGRAM(s) Oral <User Schedule>  diazepam Rectal Gel - Peds 10 milliGRAM(s) Rectal once PRN  lamoTRIgine  Oral Tab/Cap - Peds 250 milliGRAM(s) Oral daily  lamoTRIgine  Oral Tab/Cap - Peds 300 milliGRAM(s) Oral at bedtime  LORazepam IV Push - Peds 2 milliGRAM(s) IV Push once PRN  zonisamide Oral Tab/Cap - Peds 250 milliGRAM(s) Oral at bedtime  zonisamide Oral Tab/Cap - Peds 200 milliGRAM(s) Oral <User Schedule>  ___________________________________________________________________________  Recording Technique:     The patient underwent continuous Video/EEG monitoring using a cable telemetry system [employer].  The EEG was recorded from 21 electrodes using the standard 10/20 placement, with EKG.  Time synchronized digital video recording was done simultaneously with EEG recording.    The EEG was continuously sampled on disk, and spike detection and seizure detection algorithms marked portions of the EEG for further analysis offline.  Video data was stored on disk for important clinical events (indicated by manual pushbutton) and for periods identified by the seizure detection algorithm, and analyzed offline.      Video and EEG data were reviewed by the electroencephalographer on a daily basis, and selected segments were archived on compact disc.      The patient was attended by an EEG technician for eight to ten hours per day.  Patients were observed by the epilepsy nursing staff 24 hours per day.  The epilepsy center neurologist was available in person or on call 24 hours per day during the period of monitoring.    ___________________________________________________________________________    Background:  Predominant background c/o diffuse delta and theta slowing, at times with overriding beta activity. There was no clearly defined PDR or AP gradient present. Paucity of normal sleep architecture.     Slowing:  Generalized slowing as noted above.     Attenuation and Asymmetry: None.    Interictal Activity:    Abundant multifocal spike and polyspike wave complexes, that at times appeared most prevalent over the bifrontal regions, occurring in brief runs.      Patient Events/ Ictal Activity: Two electroclinical seizures present, with initial seizure characterized at onset by sentinal spike f/b diffuse attenuation, evolving to rhythmic fast activity diffusely (higher amplitude over the left hemisphere) and then polyspike wave activity that persists over the right frontal region,  offset unclear. Clinically, patient is noted to stiffen with head version to the left. The second seizure has largely the same features, with exception of a clear electrographic offset, with diffuse attenuation. See timings below:     d1 18:02:41		onset sz 1  d1 18:02:41		sent spike  d1 18:02:41		diff atten  d1 18:02:43		rhy fast  d1 18:02:43		higher amp spikes left  d1 18:02:45		CLINICAL head dev to left  d1 18:02:49		CLINICAL off  d1 18:02:53		spike wave right continues  d1 18:15:24		alert and interactive    d1 20:27:49		onset  d1 20:27:49		sent spike  d1 20:27:50		CLINICAL tonic movements  d1 20:27:51		rythmic fast  d1 20:27:54		diffuse spike wave activity  d1 20:27:55		CLINICAL subtle clonic movements head, UE  d1 20:28:07		diffuse attenuation    Activation Procedures:  Not performed.     EKG:  No clear abnormalities were noted.     Impression:  This is an abnormal video EEG study:   - Two electroclinical tonic seizures captured, with generalized onset as described above.   - Abundant multifocal spike and polyspike wave complexes, that at times appeared most prevalent over the bifrontal regions, occurring in brief runs.   - Background disorganization.     Clinical Correlation:  The EEG findings are diagnostic of tonic seizures, with onset generalized for two captured episodes, but interictal findings indicative of a tendency for seizures to originate from multiple foci. Background findings and disorganization are consistent with an epileptic encephalopathy.     Flor Aragon, PGY7  Epilepsy Fellow     I have reviewed the entire record and I agree with the findings and impression as described above.    Luciano Courtney MD  Attending Physician  Pediatric Neurology/Epilepsy

## 2025-04-24 NOTE — DISCHARGE NOTE NURSING/CASE MANAGEMENT/SOCIAL WORK - FINANCIAL ASSISTANCE
[facility] provides services at a reduced cost to those who are determined to be eligible through [facility]’s financial assistance program. Information regarding [facility]’s financial assistance program can be found by going to [email or web address] or by calling [phone].

## 2025-04-24 NOTE — DISCHARGE NOTE NURSING/CASE MANAGEMENT/SOCIAL WORK - PATIENT PORTAL LINK FT
You can access the FollowMyHealth Patient Portal offered by [facility] by registering at the following website: [email or web address]. By joining [employer]’s FollowMyHealth portal, you will also be able to view your health information using other applications (apps) compatible with our system.

## 2025-04-24 NOTE — DISCHARGE NOTE NURSING/CASE MANAGEMENT/SOCIAL WORK - NSDCPEFALRISK_GEN_ALL_CORE
For information on Fall & Injury Prevention, visit: [email or web address] OR  [email or web address] OR  https://www.cdc.gov/steadi/patient.html

## 2025-04-24 NOTE — PHARMACOTHERAPY INTERVENTION NOTE - COMMENTS
Pharmacy Admission Medication Reconciliation Note    Patient is a 19y\Male with a PMH of autism  cp epilepsy lennox gastaust  chromosomal abnormality  now admitted on 04-23-25 for increase in seizureactivity . Admission medication reconciliation completed with mother     Drug allergies/intolerances: No Known Allergies      Please see below for home medication list:  Onfi tablet 15 mg in the morning and 20 mg at night   LaMICtal 250 mg in the morning and 300 mg at night   zonisamide 100 mg oral capsule: 2 cap(s) orally in the morning   zonisamide 100 mg oral capsule: 2 cap(s) orally once a day (at bedtime) with evening dose for a total of (250 mg) (04-23-25)  zonisamide 50 mg oral capsule: 1 cap(s) orally once a day (at bedtime) (04-23-25)    Over-the-counter/supplements/herbal medications:   ***name, dose, concentration, route, frequency, indications, time of administration***  none   Evaluation:  ***The following barriers to adherence are of concern : none . medications managed by mother    Patient preferred pharmacy: [facility] [address] [place]     Please reach out to pharmacy with any questions or concerns 
Pharmacy Admission Medication Reconciliation Note    Patient is a 19y Male with a PMH of autism, nonverbal, seizure disorder with usual symptoms of multiple/many seizures per day.  However since last night the patient is had on and off constant seizures, with a total of close to 50 seizures since last night.  EMS states that they witnessed 3.  The patient was given "some" nasal Versed by mom around 3:30 AM & EMS gave 10 mg of nasal Versed as well en-route.      Please see below for home medication list:  ·	cloBAZam 10 mg oral tablet: one and a half tab (15 mg) in AM and two tabs (20 mg) in PM (04-23-25)  ·	lamoTRIgine 100 mg oral tablet, disintegrating: two and a half tablets (250 mg) in the AM and three tablets (300 mg) in the PM (04-23-25)  ·	midazolam 5 mg/inh nasal spray: instill one squirt as directed in one nostril and repeat after 10 minutes for a cluster of seizures MDD:2 squirts (04-23-25)  ·	zonisamide 100 mg oral capsule: 2 cap(s) orally 2 times a day (04-23-25)  ·	zonisamide 25 mg oral capsule: 2 cap(s) orally once a day (at bedtime) with evening dose for a total of (250 mg) (04-23-25)    Over-the-counter/supplements/herbal medications:  ·	None    Pharmacy: [facility]/PHARMACY[id number], [address]    Please reach out to pharmacy with any questions or concerns.    Kleber Mejía, PharmD, MS  PGY2 Pharmacy Resident
[facility] Pharmacotherapy Intervention Note:     Patient is a 19y Male with a PMH of autism, nonverbal, seizure disorder with usual symptoms of multiple/many seizures per day.  However since last night the patient is had on and off constant seizures, with a total of close to 50 seizures since last night.  EMS states that they witnessed 3.  The patient was given "some" nasal Versed by mom around 3:30 AM & EMS gave 10 mg of nasal Versed as well en-route.      Please see below for recommendations:   ·	Lorazepam 2mg IV PRN seizures - since patient seized so many times overnight, recommended PRN lorazepam in the event seizures continue bedside. Higher dose indicated by weight but patient given 10mg IN versed by EMS so lower dose fine with medical team    Please contact clinical pharmacy with any question or concerns.     Peterson Cullen, PharmD  Emergency Medicine Clinical Pharmacy Specialist  [phone]

## 2025-04-28 LAB
LAMOTRIGINE SERPL-MCNC: 3 UG/ML — SIGNIFICANT CHANGE UP (ref 2–20)
ZONISAMIDE SERPL-MCNC: 26.2 UG/ML — SIGNIFICANT CHANGE UP (ref 10–40)

## 2025-04-29 LAB
CLOBAZAM + NOR PNL SERPL: 218 NG/ML — SIGNIFICANT CHANGE UP (ref 30–300)
DESMETHYLCLOBAZAM: 2905 NG/ML — SIGNIFICANT CHANGE UP (ref 300–3000)

## 2025-04-30 DIAGNOSIS — G40.813 LENNOX-GASTAUT SYNDROME, INTRACTABLE, WITH STATUS EPILEPTICUS: ICD-10-CM

## 2025-04-30 RX ORDER — BRIVARACETAM 50 MG/1
50 TABLET, FILM COATED ORAL
Qty: 60 | Refills: 2 | Status: ACTIVE | COMMUNITY
Start: 2025-04-30 | End: 1900-01-01

## 2025-05-12 ENCOUNTER — APPOINTMENT (OUTPATIENT)
Dept: PEDIATRIC NEUROLOGY | Facility: CLINIC | Age: 20
End: 2025-05-12
Payer: MEDICAID

## 2025-05-12 DIAGNOSIS — G40.813 LENNOX-GASTAUT SYNDROME, INTRACTABLE, WITH STATUS EPILEPTICUS: ICD-10-CM

## 2025-05-12 DIAGNOSIS — F84.0 AUTISTIC DISORDER: ICD-10-CM

## 2025-05-12 PROCEDURE — 99213 OFFICE O/P EST LOW 20 MIN: CPT | Mod: 95

## 2025-05-16 ENCOUNTER — EMERGENCY (EMERGENCY)
Age: 20
LOS: 1 days | End: 2025-05-16
Attending: PEDIATRICS | Admitting: EMERGENCY MEDICINE
Payer: MEDICAID

## 2025-05-16 VITALS
HEART RATE: 90 BPM | DIASTOLIC BLOOD PRESSURE: 67 MMHG | RESPIRATION RATE: 20 BRPM | SYSTOLIC BLOOD PRESSURE: 98 MMHG | TEMPERATURE: 99 F | OXYGEN SATURATION: 99 %

## 2025-05-16 VITALS
OXYGEN SATURATION: 99 % | TEMPERATURE: 97 F | WEIGHT: 93.04 LBS | HEART RATE: 84 BPM | RESPIRATION RATE: 24 BRPM | DIASTOLIC BLOOD PRESSURE: 73 MMHG | SYSTOLIC BLOOD PRESSURE: 112 MMHG

## 2025-05-16 DIAGNOSIS — Z98.89 OTHER SPECIFIED POSTPROCEDURAL STATES: Chronic | ICD-10-CM

## 2025-05-16 DIAGNOSIS — Z98.890 OTHER SPECIFIED POSTPROCEDURAL STATES: Chronic | ICD-10-CM

## 2025-05-16 DIAGNOSIS — Z92.89 PERSONAL HISTORY OF OTHER MEDICAL TREATMENT: Chronic | ICD-10-CM

## 2025-05-16 DIAGNOSIS — Z96.89 PRESENCE OF OTHER SPECIFIED FUNCTIONAL IMPLANTS: Chronic | ICD-10-CM

## 2025-05-16 LAB
ALBUMIN SERPL ELPH-MCNC: 4.2 G/DL — SIGNIFICANT CHANGE UP (ref 3.3–5)
ALP SERPL-CCNC: 83 U/L — SIGNIFICANT CHANGE UP (ref 60–270)
ALT FLD-CCNC: 21 U/L — SIGNIFICANT CHANGE UP (ref 4–41)
ANION GAP SERPL CALC-SCNC: 9 MMOL/L — SIGNIFICANT CHANGE UP (ref 7–14)
AST SERPL-CCNC: 13 U/L — SIGNIFICANT CHANGE UP (ref 4–40)
BASOPHILS # BLD AUTO: 0.04 K/UL — SIGNIFICANT CHANGE UP (ref 0–0.2)
BASOPHILS NFR BLD AUTO: 0.6 % — SIGNIFICANT CHANGE UP (ref 0–2)
BILIRUB SERPL-MCNC: 0.4 MG/DL — SIGNIFICANT CHANGE UP (ref 0.2–1.2)
BUN SERPL-MCNC: 12 MG/DL — SIGNIFICANT CHANGE UP (ref 7–23)
CALCIUM SERPL-MCNC: 8.8 MG/DL — SIGNIFICANT CHANGE UP (ref 8.4–10.5)
CHLORIDE SERPL-SCNC: 109 MMOL/L — HIGH (ref 98–107)
CK SERPL-CCNC: 43 U/L — SIGNIFICANT CHANGE UP (ref 30–200)
CO2 SERPL-SCNC: 20 MMOL/L — LOW (ref 22–31)
CREAT SERPL-MCNC: 0.81 MG/DL — SIGNIFICANT CHANGE UP (ref 0.5–1.3)
EGFR: 130 ML/MIN/1.73M2 — SIGNIFICANT CHANGE UP
EGFR: 130 ML/MIN/1.73M2 — SIGNIFICANT CHANGE UP
EOSINOPHIL # BLD AUTO: 0.22 K/UL — SIGNIFICANT CHANGE UP (ref 0–0.5)
EOSINOPHIL NFR BLD AUTO: 3 % — SIGNIFICANT CHANGE UP (ref 0–6)
GLUCOSE SERPL-MCNC: 83 MG/DL — SIGNIFICANT CHANGE UP (ref 70–99)
HCT VFR BLD CALC: 44.2 % — SIGNIFICANT CHANGE UP (ref 39–50)
HGB BLD-MCNC: 15.7 G/DL — SIGNIFICANT CHANGE UP (ref 13–17)
IANC: 4.34 K/UL — SIGNIFICANT CHANGE UP (ref 1.8–7.4)
IMM GRANULOCYTES NFR BLD AUTO: 0.4 % — SIGNIFICANT CHANGE UP (ref 0–0.9)
LYMPHOCYTES # BLD AUTO: 2.16 K/UL — SIGNIFICANT CHANGE UP (ref 1–3.3)
LYMPHOCYTES # BLD AUTO: 29.8 % — SIGNIFICANT CHANGE UP (ref 13–44)
MCHC RBC-ENTMCNC: 31.8 PG — SIGNIFICANT CHANGE UP (ref 27–34)
MCHC RBC-ENTMCNC: 35.5 G/DL — SIGNIFICANT CHANGE UP (ref 32–36)
MCV RBC AUTO: 89.7 FL — SIGNIFICANT CHANGE UP (ref 80–100)
MONOCYTES # BLD AUTO: 0.47 K/UL — SIGNIFICANT CHANGE UP (ref 0–0.9)
MONOCYTES NFR BLD AUTO: 6.5 % — SIGNIFICANT CHANGE UP (ref 2–14)
NEUTROPHILS # BLD AUTO: 4.34 K/UL — SIGNIFICANT CHANGE UP (ref 1.8–7.4)
NEUTROPHILS NFR BLD AUTO: 59.7 % — SIGNIFICANT CHANGE UP (ref 43–77)
NRBC # BLD AUTO: 0 K/UL — SIGNIFICANT CHANGE UP (ref 0–0)
NRBC # FLD: 0 K/UL — SIGNIFICANT CHANGE UP (ref 0–0)
NRBC BLD AUTO-RTO: 0 /100 WBCS — SIGNIFICANT CHANGE UP (ref 0–0)
PLATELET # BLD AUTO: 190 K/UL — SIGNIFICANT CHANGE UP (ref 150–400)
POTASSIUM SERPL-MCNC: 3.7 MMOL/L — SIGNIFICANT CHANGE UP (ref 3.5–5.3)
POTASSIUM SERPL-SCNC: 3.7 MMOL/L — SIGNIFICANT CHANGE UP (ref 3.5–5.3)
PROT SERPL-MCNC: 6.3 G/DL — SIGNIFICANT CHANGE UP (ref 6–8.3)
RBC # BLD: 4.93 M/UL — SIGNIFICANT CHANGE UP (ref 4.2–5.8)
RBC # FLD: 12.2 % — SIGNIFICANT CHANGE UP (ref 10.3–14.5)
SODIUM SERPL-SCNC: 138 MMOL/L — SIGNIFICANT CHANGE UP (ref 135–145)
WBC # BLD: 7.26 K/UL — SIGNIFICANT CHANGE UP (ref 3.8–10.5)
WBC # FLD AUTO: 7.26 K/UL — SIGNIFICANT CHANGE UP (ref 3.8–10.5)

## 2025-05-16 PROCEDURE — 99291 CRITICAL CARE FIRST HOUR: CPT

## 2025-05-16 RX ORDER — LORAZEPAM 4 MG/ML
4 VIAL (ML) INJECTION ONCE
Refills: 0 | Status: DISCONTINUED | OUTPATIENT
Start: 2025-05-16 | End: 2025-05-16

## 2025-05-16 RX ORDER — CLOBAZAM 20 MG/1
20 TABLET ORAL ONCE
Refills: 0 | Status: DISCONTINUED | OUTPATIENT
Start: 2025-05-16 | End: 2025-05-16

## 2025-05-16 RX ORDER — ZONISAMIDE 25 MG
200 CAPSULE ORAL ONCE
Refills: 0 | Status: COMPLETED | OUTPATIENT
Start: 2025-05-16 | End: 2025-05-16

## 2025-05-16 RX ORDER — LAMOTRIGINE 150 MG/1
250 TABLET ORAL ONCE
Refills: 0 | Status: COMPLETED | OUTPATIENT
Start: 2025-05-16 | End: 2025-05-16

## 2025-05-16 RX ORDER — BRIVARACETAM 75 MG/1
200 TABLET, FILM COATED ORAL ONCE
Refills: 0 | Status: COMPLETED | OUTPATIENT
Start: 2025-05-16 | End: 2025-05-16

## 2025-05-16 RX ORDER — BRIVARACETAM 75 MG/1
50 TABLET, FILM COATED ORAL ONCE
Refills: 0 | Status: COMPLETED | OUTPATIENT
Start: 2025-05-16 | End: 2025-05-16

## 2025-05-16 RX ADMIN — BRIVARACETAM 20 MILLIGRAM(S): 75 TABLET, FILM COATED ORAL at 08:12

## 2025-05-16 RX ADMIN — LAMOTRIGINE 250 MILLIGRAM(S): 150 TABLET ORAL at 08:40

## 2025-05-16 RX ADMIN — Medication 200 MILLIGRAM(S): at 08:41

## 2025-05-16 RX ADMIN — CLOBAZAM 20 MILLIGRAM(S): 20 TABLET ORAL at 08:39

## 2025-05-16 RX ADMIN — BRIVARACETAM 80 MILLIGRAM(S): 75 TABLET, FILM COATED ORAL at 05:14

## 2025-05-16 RX ADMIN — Medication 4 MILLIGRAM(S): at 05:01

## 2025-05-16 RX ADMIN — Medication 840 MILLILITER(S): at 04:40

## 2025-05-16 NOTE — ED PEDIATRIC NURSE NOTE - CHPI ED NUR TIMING2
Spoke with Dr. Duckworth, discussed assessment and labs, new orders to admit the patient to A&E with routine orders, SP3, Ativan detox protocol, accu checks QAC and HS with low dose sliding scale.  TORBVX2   sudden onset

## 2025-05-16 NOTE — ED PROVIDER NOTE - NSFOLLOWUPINSTRUCTIONS_ED_ALL_ED_FT
Please increase briviact to 100mg twice daily and follow-up with Dr. Segovia.    Contact a health care provider if:  Your child has any of these problems:  Another seizure or seizures. Call each time your child has a seizure.  A change in how often or when they have seizures.  Seizures that keep happening with treatment.  Symptoms of infection or illness. These might raise the risk of having a seizure.  Side effects from medicines.  Your child isn't able to take their medicine.  Get help right away if:  Your child has any of these problems:  A seizure for the first time.  A seizure that doesn't stop after 5 minutes.  Many seizures in a row.  A seizure that makes it harder to breathe.  A seizure that leaves your child unable to speak or use a part of their body.  Your child doesn't wake up right away after a seizure.  Your child gets injured during a seizure.  Your child has confusion or pain right after a seizure.

## 2025-05-16 NOTE — ED PROVIDER NOTE - CLINICAL SUMMARY MEDICAL DECISION MAKING FREE TEXT BOX
20 yo M hx of Lennox Gastaut and CP p/w increased seizure activity, recently admitted 4/23 and discharged with medication adjustments. Active seizure activity witnessed on exam, gave Ativan IV 4 mg, obtained CBCdiff, CMP, CK, neuro consult. Will obtain levels of the medications he is currently taking and load Briviact 200 mg IV. GR PGY1 18 yo M hx of Lennox Gastaut and CP p/w increased seizure activity, recently admitted 4/23 and discharged with medication adjustments. Active seizure activity witnessed on exam, gave Ativan IV 4 mg, obtained CBCdiff, CMP, CK, neuro consult. Will obtain levels of the medications he is currently taking and load Briviact 200 mg IV. GR PGY1    Lester Loving DO (PEM Attending): Patient with complex history including CP and Lennox-Gastaut with poorly controlled seizures here for increased cluster of seizures numerous times today. Per parents reportedly at baseline has been compliant with numerous medications including new medication of Briviact.   Suspect potential triggers humid weather. However since earlier tonight has had about 50 seizures. Not responding to nasal midazolam x 2.  On arrival patient near baseline appears tired but will have some intermittent clusters of seizures.  Parents describe typical semiology of head drops however when having clusters will have facial twitching and GTC's as well.  Patient vital signs reassuring and afebrile otherwise.  Will give IV benzodiazepine and discuss closely with neurology which additional IV medications and safe disposition.  We will get basic labs, CK given duration of seizures, d/w neurology re medication levels.

## 2025-05-16 NOTE — ED PEDIATRIC NURSE REASSESSMENT NOTE - NS ED NURSE REASSESS COMMENT FT2
Pt awake and at baseline as per mother. Seizure precautions in place. Call bell within reach. comfort measures provided, safety measures maintained, family aware waiting for neurology.
Pt awake and at baseline as per mother. Tolerated PO meds. VS as documented. Seizure precautions in place. Call bell within reach. Family updated on plan of care. comfort measures provided, safety measures maintained, awaiting neuro rec.
Pt with multiple seizures lasting a few seconds each. Oxygen saturation maintained >95%. MD Loving at the bedside. Seizure precautions, cardiac monitor, and pulse ox maintained.
Pt laying in stretcher w parent at the bedside. Per parents, after receiving seizure medications (refer to eMAR), pt experiencing less seizures. Cardiac monitor, pulse ox, and seizure precautions maintained. Rounding performed. Plan of care and wait time explained. Parents express no concerns at this time, call bell within reach.
Pt acting at baseline per parents. Parents reported less seizure activity after receiving seizure meds, refer to eMAR. Seizure precautions, cardiac monitor, and pulse ox maintained. Rounding performed. Plan of care and wait time explained. Parents express no concerns at this time, call bell within reach.
Report received from LATIA Kaufman. Pt resting in stretcher with parents at bedside. Seizure precautions in place, cardiac monitor and continouos pulse oximetry in place. VS as documented. Safety measures maintained, awaiting neuro.

## 2025-05-16 NOTE — ED PROVIDER NOTE - PHYSICAL EXAMINATION
T(C): 36.3 (05-16-25 @ 03:45), Max: 36.3 (05-16-25 @ 03:45)  HR: 84 (05-16-25 @ 03:45) (84 - 84)  BP: 112/73 (05-16-25 @ 03:45) (112/73 - 112/73)  RR: 24 (05-16-25 @ 03:45) (24 - 24)  SpO2: 99% (05-16-25 @ 03:45) (99% - 99%)    CONSTITUTIONAL: Well groomed, no apparent distress  EYES: PERRLA and symmetric, EOMI, No conjunctival or scleral injection, non-icteric  RESP: No respiratory distress, no use of accessory muscles; CTA b/l, no WRR  CV: RRR, +S1S2, no MRG  GI: Soft, NT, ND, no rebound, no guarding  SKIN: No rashes or ulcers noted  NEURO: Facial twitching and twitching of upper extremities and right leg

## 2025-05-16 NOTE — ED PROVIDER NOTE - PROGRESS NOTE DETAILS
After 4 mg IV Ativan parents say patient improved.  Seizures or less frequent and less severe.  I observed patient for 5 minutes had 1 brief episode of lip twitching and one 5 to 10-second GTC which was less pronounced than previously.  Will continue with IV Briviact.  Should patient begin to cluster more frequently will consider additional doses of Ativan Was sleeping since 6 AM without any seizure activity.  He woke up by 8 or 8:30 AM and was able to take his home medications by mouth.  Patient is returning to baseline, and parents would like to discharge home.  Neurology is in discussion with his outpatient providers and will give recommendations regarding his home dose of Briviact. Per parents, patient fully returned to baseline.  Vital signs stable.  Neurology recommends increasing Briviact to 100 mg twice daily and outpatient follow-up with Dr. Segovia.  Family agrees with plan and is comfortable with discharge home.  Will continue other antiseizure medications as well.

## 2025-05-16 NOTE — ED PEDIATRIC TRIAGE NOTE - CHIEF COMPLAINT QUOTE
Pt with extensive hx including lennox-gastaut syndrome and CP. Presenting for approx  seizures since 1800. Per parents, seizures last approx 15 secs with arm stiffening and eye rolling. Nayzalam @1900 and 2130. Denies fevers. Pt baseline in triage per parents. Pt tachypneic with lungs clear b/l. NKDA. IUTD.

## 2025-05-16 NOTE — CHART NOTE - NSCHARTNOTEFT_GEN_A_CORE
20yo male with CP and intractable Lennox Gastaut syndrome with VNS, follows with Dr. Segovia, presenting due to breakthrough seizure activity with clustering of seizures this morning. Nayzilam administered at home prior to arrival. Patient has 5-10 daily seizures at baseline with semiology described as atonic seizures or focal seizures with facial twitching and arm stretching. Upon arrival to ED, patient received Ativan and Briviact 200mg load. Patient now back at baseline. Discussed with Dr. Segovia, who recommended increasing Briviact maintenance dose to 100mg BID. Patient is currently being recommended for corpus callosotomy, however family is declining at this time.    Recommendations:  - f/u AED levels  - Increase Briviact to 100mg BID (4.7mg/kg/day)  - Continue Home meds as follows:  [ ] Zonisamide 200 mg qAM 250 mg qhs   [ ] Onfi 20 mg BID   [ ] Lamictal 250 mg qAM 300 mg qh   - Can be discharged with follow up with Dr. Segovia outpatient.    Plan discussed with Dr. Segovia and Dr. Courtney

## 2025-05-16 NOTE — ED PROVIDER NOTE - PATIENT PORTAL LINK FT
You can access the FollowMyHealth Patient Portal offered by [facility] by registering at the following website: [email or web address]. By joining [employer]’s FollowMyHealth portal, you will also be able to view your health information using other applications (apps) compatible with our system.

## 2025-05-16 NOTE — ED PROVIDER NOTE - OBJECTIVE STATEMENT
18 yo M with complex hx including CP and Lennox-Gastaut p/w persistent seizures of different semiology than normal. At baseline, patient has 5-10 seizures per day, which present as a "head drop" or "body drop." However, these seizures are different because they involve twitching of face and arms. Parents report he has had 80 - 100 seizures since 5/15 6 pm, each one lasting about 15 seconds and with 3-5 mins in between seizures. Patient was recently admitted 4/23 for similar increase in seizure activity and was discharged with increase in Onfi dose. Was doing better, but then began to have increased seizure activity again. Followed with neurology outpatient where they recommended increase in Briviact dose, but parents did not give dose increase because of concern about side effects. ROS otherwise negative, no fevers, emesis, diarrhea. No recent travel or sick contacts. NKDA, VUTD.     Meds:  Briviact 50 mg BID  Zonisamide 200 mg qAM 250 mg qhs  Onfi 20 mg BID  Lamictal 250 mg qAM 300 mg qhs

## 2025-05-19 LAB
LAMOTRIGINE SERPL-MCNC: 5.1 UG/ML — SIGNIFICANT CHANGE UP (ref 2–20)
ZONISAMIDE SERPL-MCNC: 33.1 UG/ML — SIGNIFICANT CHANGE UP (ref 10–40)

## 2025-05-21 LAB
BRIVARACETAM SERPL-MCNC: 1.16 UG/ML — SIGNIFICANT CHANGE UP (ref 0.2–2)
CLOBAZAM + NOR PNL SERPL: 633 NG/ML — HIGH (ref 30–300)
DESMETHYLCLOBAZAM: 4071 NG/ML — HIGH (ref 300–3000)

## 2025-05-23 ENCOUNTER — APPOINTMENT (OUTPATIENT)
Dept: PEDIATRIC NEUROLOGY | Facility: CLINIC | Age: 20
End: 2025-05-23
Payer: MEDICAID

## 2025-05-23 PROCEDURE — 99213 OFFICE O/P EST LOW 20 MIN: CPT | Mod: 95

## 2025-05-23 RX ORDER — DIAZEPAM 10 MG/100UL
10 SPRAY NASAL
Qty: 1 | Refills: 0 | Status: ACTIVE | COMMUNITY
Start: 2025-05-23 | End: 1900-01-01

## 2025-05-23 RX ORDER — BRIVARACETAM 100 MG/1
100 TABLET, FILM COATED ORAL
Qty: 60 | Refills: 1 | Status: ACTIVE | COMMUNITY
Start: 2025-05-23 | End: 1900-01-01

## 2025-06-16 RX ORDER — DIAZEPAM 7.5 MG/100UL
2 X 7.5 SPRAY NASAL
Qty: 1 | Refills: 0 | Status: ACTIVE | COMMUNITY
Start: 2025-06-16 | End: 1900-01-01

## 2025-06-24 ENCOUNTER — NON-APPOINTMENT (OUTPATIENT)
Age: 20
End: 2025-06-24

## 2025-07-03 ENCOUNTER — NON-APPOINTMENT (OUTPATIENT)
Age: 20
End: 2025-07-03

## 2025-07-08 ENCOUNTER — OUTPATIENT (OUTPATIENT)
Dept: OUTPATIENT SERVICES | Facility: HOSPITAL | Age: 20
LOS: 1 days | End: 2025-07-08
Payer: MEDICAID

## 2025-07-08 VITALS
HEIGHT: 65 IN | RESPIRATION RATE: 18 BRPM | DIASTOLIC BLOOD PRESSURE: 73 MMHG | TEMPERATURE: 98 F | OXYGEN SATURATION: 96 % | HEART RATE: 103 BPM | SYSTOLIC BLOOD PRESSURE: 106 MMHG | WEIGHT: 97 LBS

## 2025-07-08 DIAGNOSIS — K02.62 DENTAL CARIES ON SMOOTH SURFACE PENETRATING INTO DENTIN: ICD-10-CM

## 2025-07-08 DIAGNOSIS — Z98.890 OTHER SPECIFIED POSTPROCEDURAL STATES: Chronic | ICD-10-CM

## 2025-07-08 DIAGNOSIS — Z96.89 PRESENCE OF OTHER SPECIFIED FUNCTIONAL IMPLANTS: Chronic | ICD-10-CM

## 2025-07-08 DIAGNOSIS — Z98.89 OTHER SPECIFIED POSTPROCEDURAL STATES: Chronic | ICD-10-CM

## 2025-07-08 DIAGNOSIS — Z96.89 PRESENCE OF OTHER SPECIFIED FUNCTIONAL IMPLANTS: ICD-10-CM

## 2025-07-08 DIAGNOSIS — K02.9 DENTAL CARIES, UNSPECIFIED: ICD-10-CM

## 2025-07-08 DIAGNOSIS — Z92.89 PERSONAL HISTORY OF OTHER MEDICAL TREATMENT: Chronic | ICD-10-CM

## 2025-07-08 DIAGNOSIS — R56.9 UNSPECIFIED CONVULSIONS: ICD-10-CM

## 2025-07-08 DIAGNOSIS — K05.6 PERIODONTAL DISEASE, UNSPECIFIED: ICD-10-CM

## 2025-07-08 DIAGNOSIS — Z01.818 ENCOUNTER FOR OTHER PREPROCEDURAL EXAMINATION: ICD-10-CM

## 2025-07-08 LAB
ALBUMIN SERPL ELPH-MCNC: 4.3 G/DL — SIGNIFICANT CHANGE UP (ref 3.3–5)
ALP SERPL-CCNC: 76 U/L — SIGNIFICANT CHANGE UP (ref 40–120)
ALT FLD-CCNC: 20 U/L — SIGNIFICANT CHANGE UP (ref 10–45)
ANION GAP SERPL CALC-SCNC: 11 MMOL/L — SIGNIFICANT CHANGE UP (ref 5–17)
AST SERPL-CCNC: 11 U/L — SIGNIFICANT CHANGE UP (ref 10–40)
BILIRUB SERPL-MCNC: 0.3 MG/DL — SIGNIFICANT CHANGE UP (ref 0.2–1.2)
BUN SERPL-MCNC: 14 MG/DL — SIGNIFICANT CHANGE UP (ref 7–23)
CALCIUM SERPL-MCNC: 9.2 MG/DL — SIGNIFICANT CHANGE UP (ref 8.4–10.5)
CHLORIDE SERPL-SCNC: 108 MMOL/L — SIGNIFICANT CHANGE UP (ref 96–108)
CO2 SERPL-SCNC: 24 MMOL/L — SIGNIFICANT CHANGE UP (ref 22–31)
CREAT SERPL-MCNC: 0.83 MG/DL — SIGNIFICANT CHANGE UP (ref 0.5–1.3)
EGFR: 129 ML/MIN/1.73M2 — SIGNIFICANT CHANGE UP
EGFR: 129 ML/MIN/1.73M2 — SIGNIFICANT CHANGE UP
GLUCOSE SERPL-MCNC: 99 MG/DL — SIGNIFICANT CHANGE UP (ref 70–99)
HCT VFR BLD CALC: 45.7 % — SIGNIFICANT CHANGE UP (ref 39–50)
HGB BLD-MCNC: 15.6 G/DL — SIGNIFICANT CHANGE UP (ref 13–17)
MCHC RBC-ENTMCNC: 31.4 PG — SIGNIFICANT CHANGE UP (ref 27–34)
MCHC RBC-ENTMCNC: 34.1 G/DL — SIGNIFICANT CHANGE UP (ref 32–36)
MCV RBC AUTO: 92 FL — SIGNIFICANT CHANGE UP (ref 80–100)
NRBC # BLD AUTO: 0 K/UL — SIGNIFICANT CHANGE UP (ref 0–0)
NRBC # FLD: 0 K/UL — SIGNIFICANT CHANGE UP (ref 0–0)
NRBC BLD AUTO-RTO: 0 /100 WBCS — SIGNIFICANT CHANGE UP (ref 0–0)
PLATELET # BLD AUTO: 228 K/UL — SIGNIFICANT CHANGE UP (ref 150–400)
PMV BLD: 9.3 FL — SIGNIFICANT CHANGE UP (ref 7–13)
POTASSIUM SERPL-MCNC: 3.6 MMOL/L — SIGNIFICANT CHANGE UP (ref 3.5–5.3)
POTASSIUM SERPL-SCNC: 3.6 MMOL/L — SIGNIFICANT CHANGE UP (ref 3.5–5.3)
PROT SERPL-MCNC: 6.6 G/DL — SIGNIFICANT CHANGE UP (ref 6–8.3)
RBC # BLD: 4.97 M/UL — SIGNIFICANT CHANGE UP (ref 4.2–5.8)
RBC # FLD: 12.2 % — SIGNIFICANT CHANGE UP (ref 10.3–14.5)
SODIUM SERPL-SCNC: 143 MMOL/L — SIGNIFICANT CHANGE UP (ref 135–145)
WBC # BLD: 7.11 K/UL — SIGNIFICANT CHANGE UP (ref 3.8–10.5)
WBC # FLD AUTO: 7.11 K/UL — SIGNIFICANT CHANGE UP (ref 3.8–10.5)

## 2025-07-08 PROCEDURE — 80053 COMPREHEN METABOLIC PANEL: CPT

## 2025-07-08 PROCEDURE — G0463: CPT

## 2025-07-08 PROCEDURE — 36415 COLL VENOUS BLD VENIPUNCTURE: CPT

## 2025-07-08 PROCEDURE — 85027 COMPLETE CBC AUTOMATED: CPT

## 2025-07-08 RX ORDER — BRIVARACETAM 75 MG/1
3 TABLET, FILM COATED ORAL
Refills: 0 | DISCHARGE

## 2025-07-08 RX ORDER — DIAZEPAM 5 MG/1
15 TABLET ORAL
Refills: 0 | DISCHARGE

## 2025-07-08 RX ORDER — BRIVARACETAM 75 MG/1
1 TABLET, FILM COATED ORAL
Refills: 0 | DISCHARGE

## 2025-07-08 NOTE — H&P PST ADULT - PROBLEM SELECTOR PLAN 1
Comprehensive dental exam w/ anesthesia on 7/16/25 with Dr. Janet Newman.  Pre-op instructions given. Questions answered.

## 2025-07-08 NOTE — H&P PST ADULT - PROBLEM SELECTOR PLAN 2
Continue medications as prescribed, including day of procedure with sips of water.  Neurology eval prior to procedure.

## 2025-07-08 NOTE — H&P PST ADULT - NSICDXPROCEDURE_GEN_ALL_CORE_FT
PROCEDURES:  Removal of vagal nerve stimulator generator 12-Mar-2025 13:38:16  Citlaly Block  Implantation of vagal nerve stimulator electrode 12-Mar-2025 13:39:52  Citlaly Block   PROCEDURES:  Dental examination 08-Jul-2025 12:19:21  Yeimi Flores

## 2025-07-08 NOTE — H&P PST ADULT - HISTORY OF PRESENT ILLNESS
19y old male with developmental delays, wheelchair bound and intractable Lennox-Gastaut syndrome with status epilepticus. As per mom, his current vagal nerve stimulation is not working because there is no more battery life. As per mom she mentioned that when the original vagal nerve stimulator was placed 7 years ago he was seizure free for only one month. He suffers from seizures daily. She states his seizures can look like mild shaking, head drops and stretching out legs bilaterally.  He has sialorrhea. There is a diagnoses of ZOE. As per mom, he never had a sleep study. He has been sedated in the past for MRI, vagal nerve stimulator implant and dental work. As per mom, he has had no issues with anesthesia. He is scheduled removal and reinsertion of vagal nerve stimulator with Dr. Linder on 3/17/25 at [facility] as a same day admission.    started at age of 9 - seizures    worse with humidity   in ER last month - at [facility] 18 yo male w/ Pmhx with developmental delays, intractable Lennox-Gastaut syndrome with status epilepticus (last seizure this morning). As per mom, his seizures are more frequent lately "due to humidity". He is s/p removal and reinsertion of vagal nerve stimulator with Dr. Linder on 3/17/25. He suffers from seizures daily. She states his seizures can look like mild shaking, head drops and stretching out legs bilaterally. Most recent seizure was this morning.  presents to PST prior to scheduled comprehensive dental treatment under anesthesia w/ Dr. Janet Newman on 7/16/25.     Pt is nonverbal. On "soft diet"; able to take pills with sips of water.

## 2025-07-08 NOTE — H&P PST ADULT - NSICDXPASTSURGICALHX_GEN_ALL_CORE_FT
PAST SURGICAL HISTORY:  History of corpus callostomy corpus callostomy 3/2021    History of dental surgery April 2020    History of MRI MRI brain with sedation on 11/13/20    S/P adenoidectomy 3/31/20 at [place]    S/P endoscopy 2012    Status post VNS (vagus nerve stimulator) placement

## 2025-07-08 NOTE — H&P PST ADULT - REASON FOR ADMISSION
The patient is here today for presurgical testing for scheduled removal and reinsertion of vagal nerve stimulator with Dr. Linder on 3/17/25 at [facility] as a same day admission. dental

## 2025-07-08 NOTE — H&P PST ADULT - ASSESSMENT
DASI score: 4  DASI activity: ambulates w/ assistance at school / PT/ able to stand and pivot with one assist - presents to PST in wheelchair  Loose teeth or denture: mom denies

## 2025-07-17 ENCOUNTER — RX RENEWAL (OUTPATIENT)
Age: 20
End: 2025-07-17

## 2025-07-24 ENCOUNTER — NON-APPOINTMENT (OUTPATIENT)
Age: 20
End: 2025-07-24

## 2025-08-13 ENCOUNTER — APPOINTMENT (OUTPATIENT)
Dept: PEDIATRICS | Facility: CLINIC | Age: 20
End: 2025-08-13
Payer: MEDICAID

## 2025-08-13 VITALS — HEART RATE: 68 BPM | WEIGHT: 93 LBS | TEMPERATURE: 98.6 F | OXYGEN SATURATION: 97 %

## 2025-08-13 DIAGNOSIS — J06.9 ACUTE UPPER RESPIRATORY INFECTION, UNSPECIFIED: ICD-10-CM

## 2025-08-13 PROCEDURE — 99213 OFFICE O/P EST LOW 20 MIN: CPT

## 2025-08-25 ENCOUNTER — NON-APPOINTMENT (OUTPATIENT)
Age: 20
End: 2025-08-25

## 2025-09-12 ENCOUNTER — RX RENEWAL (OUTPATIENT)
Age: 20
End: 2025-09-12

## (undated) DEVICE — DRAPE CAMERA ENDOMATE

## (undated) DEVICE — SUT VICRYL 3-0 18" SH UNDYED (POP-OFF)

## (undated) DEVICE — BIPOLAR FORCEP STRYKER STANDARD 7" X 0.5MM (YELLOW)

## (undated) DEVICE — SUT SILK 2-0 24" TIES

## (undated) DEVICE — SUT SILK 2-0 30" SH

## (undated) DEVICE — TUNNELER TOOL INSTRUMENT 402 MODEL

## (undated) DEVICE — BIPOLAR FORCEP STRYKER STANDARD 8" X 1MM (YELLOW)

## (undated) DEVICE — DRAPE 3/4 SHEET W REINFORCEMENT 56X77"

## (undated) DEVICE — DRAPE SPLIT SHEET 77" X 108"

## (undated) DEVICE — DRAPE LIGHT HANDLE COVER (BLUE)

## (undated) DEVICE — TONSIL ROLLS

## (undated) DEVICE — LABELS BLANK W PEN

## (undated) DEVICE — WARMING BLANKET LOWER ADULT

## (undated) DEVICE — MARKING PEN W RULER

## (undated) DEVICE — SPONGE PEANUT AUTO COUNT

## (undated) DEVICE — GOWN TRIMAX LG

## (undated) DEVICE — DRSG TAPE HYPAFIX 4"

## (undated) DEVICE — POSITIONER FOAM EGG CRATE ULNAR 2PCS (PINK)

## (undated) DEVICE — ELCTR BOVIE PENCIL SMOKE EVACUATION

## (undated) DEVICE — GLV 6.5 PROTEXIS (WHITE)

## (undated) DEVICE — BIPOLAR FORCEP STRYKER STANDARD 7" X 1MM (YELLOW)

## (undated) DEVICE — VISITEC 4X4

## (undated) DEVICE — SUT MONOCRYL 4-0 27" PS-2 UNDYED

## (undated) DEVICE — ELCTR STRYKER NEPTUNE SMOKE EVACUATION PENCIL (GREEN)

## (undated) DEVICE — GLV 7 PROTEXIS (WHITE)

## (undated) DEVICE — SOL IRR POUR H2O 500ML

## (undated) DEVICE — SYR LUER LOK 20CC

## (undated) DEVICE — DRAPE 3/4 SHEET 52X76"

## (undated) DEVICE — DRAPE LAPAROTOMY W VELCRO CORD TABS

## (undated) DEVICE — GLV 8 PROTEXIS (WHITE)

## (undated) DEVICE — DRAPE INSTRUMENT POUCH 6.75" X 11"

## (undated) DEVICE — MEDICATION LABELS W MARKER

## (undated) DEVICE — NDL HYPO SAFE 18G X 1.5" (PINK)

## (undated) DEVICE — PACK NEURO

## (undated) DEVICE — DRAPE MAYO STAND 30"

## (undated) DEVICE — VAGINAL PACKING 2 X 6"

## (undated) DEVICE — GLV 7.5 PROTEXIS (WHITE)

## (undated) DEVICE — DRAPE TOWEL BLUE 17" X 24"

## (undated) DEVICE — SUCTION YANKAUER OPEN TIP NO VENT CURVE

## (undated) DEVICE — SOL IRR POUR H2O 250ML

## (undated) DEVICE — SUT NUROLON 4-0 8-18" RB-1

## (undated) DEVICE — VESSEL LOOP MINI-BLUE 0.075" X 16"

## (undated) DEVICE — LAP PAD 18 X 18"

## (undated) DEVICE — SPECIMEN CONTAINER 100ML

## (undated) DEVICE — NEPTUNE 4-PORT MANIFOLD STANDARD

## (undated) DEVICE — TUBING SUCTION 20FT

## (undated) DEVICE — DRSG TELFA 3 X 8

## (undated) DEVICE — SOL IRR POUR NS 0.9% 500ML

## (undated) DEVICE — DRAPE TOWEL BLUE STICKY

## (undated) DEVICE — ELCTR BOVIE TIP NEEDLE INSULATED 2.8" EDGE

## (undated) DEVICE — WARMING BLANKET FULL PEDS

## (undated) DEVICE — VENODYNE/SCD SLEEVE CALF MEDIUM

## (undated) DEVICE — TAPE SILK 3"

## (undated) DEVICE — PACK DENTAL

## (undated) DEVICE — DRSG CURITY GAUZE SPONGE 4 X 4" 12-PLY

## (undated) DEVICE — SOL IRR POUR NS 0.9% 1000ML

## (undated) DEVICE — STAPLER SKIN VISI-STAT 35 WIDE

## (undated) DEVICE — CATH IV SAFE INSYTE 14G X 1.75" (ORANGE)